# Patient Record
Sex: FEMALE | Race: BLACK OR AFRICAN AMERICAN | NOT HISPANIC OR LATINO | Employment: FULL TIME | URBAN - METROPOLITAN AREA
[De-identification: names, ages, dates, MRNs, and addresses within clinical notes are randomized per-mention and may not be internally consistent; named-entity substitution may affect disease eponyms.]

---

## 2018-01-19 ENCOUNTER — HOSPITAL ENCOUNTER (OUTPATIENT)
Facility: HOSPITAL | Age: 28
Discharge: HOME | End: 2018-01-20
Attending: EMERGENCY MEDICINE | Admitting: UROLOGY
Payer: COMMERCIAL

## 2018-01-19 ENCOUNTER — HOSPITAL ENCOUNTER (EMERGENCY)
Facility: OTHER | Age: 28
End: 2018-01-19
Attending: EMERGENCY MEDICINE
Payer: COMMERCIAL

## 2018-01-19 VITALS
OXYGEN SATURATION: 100 % | HEIGHT: 65 IN | HEART RATE: 74 BPM | WEIGHT: 130 LBS | RESPIRATION RATE: 17 BRPM | BODY MASS INDEX: 21.66 KG/M2 | DIASTOLIC BLOOD PRESSURE: 57 MMHG | SYSTOLIC BLOOD PRESSURE: 115 MMHG | TEMPERATURE: 97 F

## 2018-01-19 DIAGNOSIS — N13.30 HYDROURETERONEPHROSIS: ICD-10-CM

## 2018-01-19 DIAGNOSIS — N13.5 URETERAL OBSTRUCTION: Primary | ICD-10-CM

## 2018-01-19 DIAGNOSIS — R10.9 ABDOMINAL PAIN: ICD-10-CM

## 2018-01-19 DIAGNOSIS — N20.1 URETERAL STONE: ICD-10-CM

## 2018-01-19 DIAGNOSIS — N20.0 KIDNEY STONES: Primary | ICD-10-CM

## 2018-01-19 DIAGNOSIS — N13.9 OBSTRUCTIVE UROPATHY: ICD-10-CM

## 2018-01-19 LAB
ALBUMIN SERPL-MCNC: 4.2 G/DL (ref 3.3–5.5)
ALP SERPL-CCNC: 53 U/L (ref 42–141)
B-HCG UR QL: NEGATIVE
BILIRUB SERPL-MCNC: 0.8 MG/DL (ref 0.2–1.6)
BILIRUBIN, POC UA: NEGATIVE
BLOOD, POC UA: ABNORMAL
BUN SERPL-MCNC: 13 MG/DL (ref 7–22)
CALCIUM SERPL-MCNC: 9.9 MG/DL (ref 8–10.3)
CHLORIDE SERPL-SCNC: 103 MMOL/L (ref 98–108)
CLARITY, POC UA: CLEAR
COLOR, POC UA: ABNORMAL
CREAT SERPL-MCNC: 0.9 MG/DL (ref 0.6–1.2)
CTP QC/QA: YES
GLUCOSE SERPL-MCNC: 105 MG/DL (ref 73–118)
GLUCOSE, POC UA: NEGATIVE
KETONES, POC UA: ABNORMAL
LEUKOCYTE EST, POC UA: NEGATIVE
NITRITE, POC UA: NEGATIVE
PH UR STRIP: 8 [PH]
POC ALT (SGPT): 19 U/L (ref 10–47)
POC AST (SGOT): 28 U/L (ref 11–38)
POC TCO2: 24 MMOL/L (ref 18–33)
POTASSIUM BLD-SCNC: 3.6 MMOL/L (ref 3.6–5.1)
PROTEIN, POC UA: ABNORMAL
PROTEIN, POC: 7.8 G/DL (ref 6.4–8.1)
SODIUM BLD-SCNC: 144 MMOL/L (ref 128–145)
SPECIFIC GRAVITY, POC UA: 1.02
UROBILINOGEN, POC UA: 0.2 E.U./DL

## 2018-01-19 PROCEDURE — 87086 URINE CULTURE/COLONY COUNT: CPT

## 2018-01-19 PROCEDURE — 96372 THER/PROPH/DIAG INJ SC/IM: CPT | Mod: XU

## 2018-01-19 PROCEDURE — 99284 EMERGENCY DEPT VISIT MOD MDM: CPT | Mod: ,,, | Performed by: EMERGENCY MEDICINE

## 2018-01-19 PROCEDURE — 96374 THER/PROPH/DIAG INJ IV PUSH: CPT

## 2018-01-19 PROCEDURE — G0378 HOSPITAL OBSERVATION PER HR: HCPCS

## 2018-01-19 PROCEDURE — 63600175 PHARM REV CODE 636 W HCPCS: Performed by: NURSE PRACTITIONER

## 2018-01-19 PROCEDURE — 25000003 PHARM REV CODE 250: Performed by: NURSE PRACTITIONER

## 2018-01-19 PROCEDURE — 25000003 PHARM REV CODE 250: Performed by: STUDENT IN AN ORGANIZED HEALTH CARE EDUCATION/TRAINING PROGRAM

## 2018-01-19 PROCEDURE — 81025 URINE PREGNANCY TEST: CPT | Performed by: NURSE PRACTITIONER

## 2018-01-19 PROCEDURE — 96361 HYDRATE IV INFUSION ADD-ON: CPT

## 2018-01-19 PROCEDURE — 99244 OFF/OP CNSLTJ NEW/EST MOD 40: CPT | Mod: ,,, | Performed by: UROLOGY

## 2018-01-19 PROCEDURE — 96375 TX/PRO/DX INJ NEW DRUG ADDON: CPT

## 2018-01-19 PROCEDURE — 99285 EMERGENCY DEPT VISIT HI MDM: CPT | Mod: 25

## 2018-01-19 PROCEDURE — 99284 EMERGENCY DEPT VISIT MOD MDM: CPT | Mod: 25,27

## 2018-01-19 PROCEDURE — 81003 URINALYSIS AUTO W/O SCOPE: CPT

## 2018-01-19 PROCEDURE — 80053 COMPREHEN METABOLIC PANEL: CPT

## 2018-01-19 PROCEDURE — 85025 COMPLETE CBC W/AUTO DIFF WBC: CPT

## 2018-01-19 PROCEDURE — 96365 THER/PROPH/DIAG IV INF INIT: CPT

## 2018-01-19 RX ORDER — ONDANSETRON 4 MG/1
8 TABLET, ORALLY DISINTEGRATING ORAL
Status: COMPLETED | OUTPATIENT
Start: 2018-01-19 | End: 2018-01-19

## 2018-01-19 RX ORDER — SODIUM CHLORIDE 9 MG/ML
INJECTION, SOLUTION INTRAVENOUS CONTINUOUS
Status: DISCONTINUED | OUTPATIENT
Start: 2018-01-19 | End: 2018-01-20 | Stop reason: HOSPADM

## 2018-01-19 RX ORDER — KETOROLAC TROMETHAMINE 30 MG/ML
15 INJECTION, SOLUTION INTRAMUSCULAR; INTRAVENOUS
Status: COMPLETED | OUTPATIENT
Start: 2018-01-19 | End: 2018-01-19

## 2018-01-19 RX ORDER — OXYCODONE HYDROCHLORIDE 5 MG/1
10 TABLET ORAL EVERY 4 HOURS PRN
Status: DISCONTINUED | OUTPATIENT
Start: 2018-01-19 | End: 2018-01-20 | Stop reason: HOSPADM

## 2018-01-19 RX ORDER — SODIUM CHLORIDE 0.9 % (FLUSH) 0.9 %
3 SYRINGE (ML) INJECTION
Status: DISCONTINUED | OUTPATIENT
Start: 2018-01-19 | End: 2018-01-20 | Stop reason: HOSPADM

## 2018-01-19 RX ORDER — ONDANSETRON 2 MG/ML
4 INJECTION INTRAMUSCULAR; INTRAVENOUS EVERY 6 HOURS PRN
Status: DISCONTINUED | OUTPATIENT
Start: 2018-01-19 | End: 2018-01-20 | Stop reason: HOSPADM

## 2018-01-19 RX ORDER — SODIUM CHLORIDE 9 MG/ML
1000 INJECTION, SOLUTION INTRAVENOUS
Status: COMPLETED | OUTPATIENT
Start: 2018-01-19 | End: 2018-01-19

## 2018-01-19 RX ORDER — OXYCODONE HYDROCHLORIDE 5 MG/1
5 TABLET ORAL EVERY 4 HOURS PRN
Status: DISCONTINUED | OUTPATIENT
Start: 2018-01-19 | End: 2018-01-20 | Stop reason: HOSPADM

## 2018-01-19 RX ORDER — ACETAMINOPHEN 10 MG/ML
1000 INJECTION, SOLUTION INTRAVENOUS EVERY 8 HOURS
Status: DISCONTINUED | OUTPATIENT
Start: 2018-01-20 | End: 2018-01-20 | Stop reason: HOSPADM

## 2018-01-19 RX ORDER — DIPHENHYDRAMINE HCL 25 MG
25 CAPSULE ORAL EVERY 12 HOURS PRN
Status: DISCONTINUED | OUTPATIENT
Start: 2018-01-19 | End: 2018-01-20 | Stop reason: HOSPADM

## 2018-01-19 RX ORDER — DICYCLOMINE HYDROCHLORIDE 10 MG/ML
20 INJECTION INTRAMUSCULAR
Status: COMPLETED | OUTPATIENT
Start: 2018-01-19 | End: 2018-01-19

## 2018-01-19 RX ORDER — TAMSULOSIN HYDROCHLORIDE 0.4 MG/1
0.4 CAPSULE ORAL NIGHTLY
Status: DISCONTINUED | OUTPATIENT
Start: 2018-01-19 | End: 2018-01-20 | Stop reason: HOSPADM

## 2018-01-19 RX ORDER — MORPHINE SULFATE 2 MG/ML
3 INJECTION, SOLUTION INTRAMUSCULAR; INTRAVENOUS
Status: DISCONTINUED | OUTPATIENT
Start: 2018-01-19 | End: 2018-01-20 | Stop reason: HOSPADM

## 2018-01-19 RX ORDER — CEFTRIAXONE 1 G/1
1 INJECTION, POWDER, FOR SOLUTION INTRAMUSCULAR; INTRAVENOUS
Status: COMPLETED | OUTPATIENT
Start: 2018-01-19 | End: 2018-01-19

## 2018-01-19 RX ADMIN — CEFTRIAXONE SODIUM 1 G: 1 INJECTION, POWDER, FOR SOLUTION INTRAMUSCULAR; INTRAVENOUS at 04:01

## 2018-01-19 RX ADMIN — ONDANSETRON 8 MG: 4 TABLET, ORALLY DISINTEGRATING ORAL at 01:01

## 2018-01-19 RX ADMIN — TAMSULOSIN HYDROCHLORIDE 0.4 MG: 0.4 CAPSULE ORAL at 11:01

## 2018-01-19 RX ADMIN — DICYCLOMINE HYDROCHLORIDE 20 MG: 20 INJECTION, SOLUTION INTRAMUSCULAR at 01:01

## 2018-01-19 RX ADMIN — SODIUM CHLORIDE 1000 ML: 0.9 INJECTION, SOLUTION INTRAVENOUS at 02:01

## 2018-01-19 RX ADMIN — KETOROLAC TROMETHAMINE 15 MG: 30 INJECTION, SOLUTION INTRAMUSCULAR at 02:01

## 2018-01-19 RX ADMIN — PROMETHAZINE HYDROCHLORIDE 12.5 MG: 25 INJECTION INTRAMUSCULAR; INTRAVENOUS at 02:01

## 2018-01-19 NOTE — ED PROVIDER NOTES
"Encounter Date: 1/19/2018       History     Chief Complaint   Patient presents with    Abdominal Pain     pt states "I started having right-sided abdominal pain after eating cereal this morning at work" "I vomited a few times this morning"     HPI  Review of patient's allergies indicates:  No Known Allergies  History reviewed. No pertinent past medical history.  History reviewed. No pertinent surgical history.  History reviewed. No pertinent family history.  Social History   Substance Use Topics    Smoking status: Never Smoker    Smokeless tobacco: Never Used    Alcohol use No     Review of Systems   Constitutional: Negative for fever.   HENT: Negative for sore throat.    Respiratory: Negative for shortness of breath.    Cardiovascular: Negative for chest pain.   Gastrointestinal: Positive for abdominal pain, nausea and vomiting.   Genitourinary: Negative for dysuria.   Musculoskeletal: Negative for back pain.   Skin: Negative for rash.   Neurological: Negative for weakness.   Hematological: Does not bruise/bleed easily.   All other systems reviewed and are negative.      Physical Exam     Initial Vitals [01/19/18 1257]   BP Pulse Resp Temp SpO2   115/64 70 18 97.4 °F (36.3 °C) 97 %      MAP       81         Physical Exam    Nursing note and vitals reviewed.  Constitutional: Vital signs are normal. She appears well-developed. She is cooperative. She does not appear ill.   HENT:   Head: Normocephalic and atraumatic.   Right Ear: External ear normal.   Left Ear: External ear normal.   Nose: Nose normal.   Mouth/Throat: Oropharynx is clear and moist.   Eyes: Conjunctivae and lids are normal. Pupils are equal, round, and reactive to light.   Neck: Normal range of motion. Neck supple.   Cardiovascular: Normal rate, regular rhythm, normal heart sounds and intact distal pulses.   Pulmonary/Chest: Effort normal and breath sounds normal.   Abdominal: Soft. Normal appearance and bowel sounds are normal. There is no " tenderness.   Musculoskeletal: Normal range of motion.   Neurological: She is alert and oriented to person, place, and time.   Skin: Skin is warm, dry and intact. Capillary refill takes less than 2 seconds. No rash noted.   Psychiatric: She has a normal mood and affect. Her behavior is normal. Judgment and thought content normal. Cognition and memory are normal.         ED Course   Procedures  Labs Reviewed   POCT URINALYSIS W/O SCOPE - Abnormal; Notable for the following:        Result Value    Glucose, UA Negative (*)     Bilirubin, UA Negative (*)     Ketones, UA 2+ (*)     Blood, UA 3+ (*)     Protein, UA 1+ (*)     Nitrite, UA Negative (*)     Leukocytes, UA Negative (*)     All other components within normal limits   POCT URINE PREGNANCY   POCT URINALYSIS W/O SCOPE   POCT CBC        Imaging Results          CT Renal Stone Study ABD Pelvis WO (Final result)  Result time 01/19/18 15:14:03    Final result by Aleksey Chung MD (01/19/18 15:14:03)                 Impression:        1. Right-sided hydroureteronephrosis likely secondary to a 4-5 mm calculus within the distal right ureter. Additional multiple right-sided nephroliths.    2. Moderate to severe left-sided hydronephrosis with marked dilatation of the left renal pelvis but no associated left-sided hydroureter, discrete mechanical cause or radiodense calcifications seen within the left collecting system. 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      Electronically signed by: ALEKSEY CHUNG MD, MD  Date:     01/19/18  Time:    15:14              Narrative:    CT of the abdomen and pelvis without contrast per renal stone protocol:    Results:  Comparison made to abdominal series earlier same day.  Please note that the lack of intravenous contrast limits evaluation of soft tissue and vascular structures. Study is somewhat limited by paucity of intra-abdominal  fat with closely apposed loops of bowel.    The lung bases are clear.  The visualized heart and pericardium are unremarkable.      The unenhanced liver, gallbladder, pancreas, spleen, stomach, duodenum, and adrenal glands are without significant abnormality. No biliary ductal dilatation.    The kidneys are normal in size and location.  There is mild right-sided hydroureteronephrosis to the level of the right lower quadrant/upper pelvis where there is a 4-5 mm rounded calcification suggestive of an obstructing distal ureteral calculus. Multiple scattered subcentimeter calcifications noted throughout the right kidney likely corresponding to calcification seen on recent abdominal radiograph, with the largest measuring up to 4-5 mm at the interpolar region. There is moderate to severe hydronephrosis with marked dilatation of the left renal pelvis without definite left-sided hydroureter or radiodense left-sided renal or ureteral calculus seen. The left ureter is not definitively seen.  No radiodense calculus seen within the urinary bladder. No significant right upper stranding seen on either side. The urinary bladder is suboptimally distended.  Noncontrast appearance of the uterus and bilateral adnexal regions are within normal limits. No significant amount of free fluid seen within the pelvis. A few scattered punctate pelvic phleboliths noted.    No ascites or free air.  No lymphadenopathy.    The appendix and terminal ileum appear normal.  The small and large loops of bowel are normal in caliber without focal wall thickening or adjacent fat stranding.  Tiny fat containing umbilical hernia. No pneumatosis or portal venous gas.    The aorta tapers appropriately in its descent through the abdomen.    The osseous structures appear intact.                             X-Ray Abdomen Flat And Erect (Final result)  Result time 01/19/18 13:36:06    Final result by Andrew Chung MD (01/19/18 13:36:06)                  Impression:         Nonobstructive bowel gas pattern.    Right midabdominal multiple small calcifications which may represent renal nephroliths. Correlate clinically.    Prominent right hepatic shadow which could represent Riedel's lobe configuration versus hepatomegaly.      Electronically signed by: ALEKSEY THOMASON MD, MD  Date:     01/19/18  Time:    13:36              Narrative:    Comparison: None.    Findings: Upright and supine views of the abdomen.  Relative paucity of bowel gas with the exception of mildly distended gastric bubble and mild amount of gas and stool project over the pelvis likely within the sigmoid colon and rectum. No dilated loops of bowel or small bowel air-fluid levels to suggest obstruction.    Multiple small calcifications project over the right midabdomen/renal shadow, ranging 1--3 mm.    No free air. Prominent hepatic shadow, without significant mass effect.    Osseous structures appear intact.  Visualized lung bases are clear.                                  Medical Decision Making:   Initial Assessment:   A 27-year-old female presents to the ED with complaints of right-sided abdominal pain and vomiting.  Patient states she ate cereal and then she vomited 3 times.  Patient denies fever or diarrhea.  Differential Diagnosis:   Acute appendicitis  UTI  Pyelonephritis  Kidney stone    Clinical Tests:   Lab Tests: Ordered and Reviewed  The following lab test(s) were unremarkable: CMP and CBC  ED Management:  Physical exam. UPT. U/A. CMP, CBC. Zofran ODT 8 mg. Oral fluid challenge not tolerated well. 1 Liter NS bolus. Promethazine 12.5 mg IV. Nausea improved.   This patient would appear to have a left-sided dilated nonfunctioning collecting system that is chronic.  The patient presented with acute right-sided symptoms confirmed with a 4-5 mm left distal ureteral stone with mild hydro-I spoke with Dr. Wynne who was on-call for urology today and he and I both feel the patient would benefit  from urological evaluation and possible stenting of that right kidney.  The patient's urine was sent for culture and the patient receive Rocephin and is currently comfortable and department patient is currently pending transport via EMS to [facility] main [facility].                   ED Course      Clinical Impression:   The primary encounter diagnosis was Kidney stones. Diagnoses of Abdominal pain and Obstructive uropathy were also pertinent to this visit.                           Sonia Peter, CODY  01/19/18 [id number]

## 2018-01-20 ENCOUNTER — ANESTHESIA (OUTPATIENT)
Dept: SURGERY | Facility: HOSPITAL | Age: 28
End: 2018-01-20
Payer: COMMERCIAL

## 2018-01-20 ENCOUNTER — SURGERY (OUTPATIENT)
Age: 28
End: 2018-01-20

## 2018-01-20 ENCOUNTER — ANESTHESIA EVENT (OUTPATIENT)
Dept: SURGERY | Facility: HOSPITAL | Age: 28
End: 2018-01-20
Payer: COMMERCIAL

## 2018-01-20 VITALS
DIASTOLIC BLOOD PRESSURE: 72 MMHG | SYSTOLIC BLOOD PRESSURE: 127 MMHG | HEART RATE: 70 BPM | TEMPERATURE: 99 F | OXYGEN SATURATION: 99 % | RESPIRATION RATE: 16 BRPM | HEIGHT: 65 IN | WEIGHT: 135 LBS | BODY MASS INDEX: 22.49 KG/M2

## 2018-01-20 PROBLEM — N20.0 NEPHROLITHIASIS: Status: ACTIVE | Noted: 2018-01-20

## 2018-01-20 PROBLEM — N13.5 URETEROPELVIC JUNCTION (UPJ) OBSTRUCTION, LEFT: Status: ACTIVE | Noted: 2018-01-20

## 2018-01-20 PROCEDURE — 82365 CALCULUS SPECTROSCOPY: CPT

## 2018-01-20 PROCEDURE — C1758 CATHETER, URETERAL: HCPCS | Performed by: UROLOGY

## 2018-01-20 PROCEDURE — G0378 HOSPITAL OBSERVATION PER HR: HCPCS

## 2018-01-20 PROCEDURE — 37000008 HC ANESTHESIA 1ST 15 MINUTES: Performed by: UROLOGY

## 2018-01-20 PROCEDURE — 36000706: Performed by: UROLOGY

## 2018-01-20 PROCEDURE — 25000003 PHARM REV CODE 250: Performed by: NURSE ANESTHETIST, CERTIFIED REGISTERED

## 2018-01-20 PROCEDURE — D9220A PRA ANESTHESIA: Mod: CRNA,,, | Performed by: NURSE ANESTHETIST, CERTIFIED REGISTERED

## 2018-01-20 PROCEDURE — 25500020 PHARM REV CODE 255: Performed by: UROLOGY

## 2018-01-20 PROCEDURE — C1769 GUIDE WIRE: HCPCS | Performed by: UROLOGY

## 2018-01-20 PROCEDURE — 71000033 HC RECOVERY, INTIAL HOUR: Performed by: UROLOGY

## 2018-01-20 PROCEDURE — 36000707: Performed by: UROLOGY

## 2018-01-20 PROCEDURE — S0028 INJECTION, FAMOTIDINE, 20 MG: HCPCS | Performed by: NURSE ANESTHETIST, CERTIFIED REGISTERED

## 2018-01-20 PROCEDURE — 52356 CYSTO/URETERO W/LITHOTRIPSY: CPT | Mod: RT,,, | Performed by: UROLOGY

## 2018-01-20 PROCEDURE — 27201423 OPTIME MED/SURG SUP & DEVICES STERILE SUPPLY: Performed by: UROLOGY

## 2018-01-20 PROCEDURE — 25000003 PHARM REV CODE 250: Performed by: STUDENT IN AN ORGANIZED HEALTH CARE EDUCATION/TRAINING PROGRAM

## 2018-01-20 PROCEDURE — C2617 STENT, NON-COR, TEM W/O DEL: HCPCS | Performed by: UROLOGY

## 2018-01-20 PROCEDURE — 63600175 PHARM REV CODE 636 W HCPCS: Performed by: STUDENT IN AN ORGANIZED HEALTH CARE EDUCATION/TRAINING PROGRAM

## 2018-01-20 PROCEDURE — 74420 UROGRAPHY RTRGR +-KUB: CPT | Mod: 26,,, | Performed by: UROLOGY

## 2018-01-20 PROCEDURE — 52332 CYSTOSCOPY AND TREATMENT: CPT | Mod: 59,LT,, | Performed by: UROLOGY

## 2018-01-20 PROCEDURE — D9220A PRA ANESTHESIA: Mod: ANES,,, | Performed by: ANESTHESIOLOGY

## 2018-01-20 PROCEDURE — 37000009 HC ANESTHESIA EA ADD 15 MINS: Performed by: UROLOGY

## 2018-01-20 PROCEDURE — 63600175 PHARM REV CODE 636 W HCPCS: Performed by: NURSE ANESTHETIST, CERTIFIED REGISTERED

## 2018-01-20 PROCEDURE — 76000 FLUOROSCOPY <1 HR PHYS/QHP: CPT | Mod: 26,59,, | Performed by: UROLOGY

## 2018-01-20 DEVICE — STENT URETERAL UNIV 6FR 24CM: Type: IMPLANTABLE DEVICE | Site: URETER | Status: FUNCTIONAL

## 2018-01-20 RX ORDER — HYOSCYAMINE SULFATE 0.12 MG/1
0.12 TABLET SUBLINGUAL EVERY 4 HOURS PRN
Qty: 20 TABLET | Refills: 1 | Status: ON HOLD | OUTPATIENT
Start: 2018-01-20 | End: 2018-03-09

## 2018-01-20 RX ORDER — ONDANSETRON 2 MG/ML
INJECTION INTRAMUSCULAR; INTRAVENOUS
Status: DISCONTINUED | OUTPATIENT
Start: 2018-01-20 | End: 2018-01-20

## 2018-01-20 RX ORDER — CEFAZOLIN SODIUM 1 G/3ML
INJECTION, POWDER, FOR SOLUTION INTRAMUSCULAR; INTRAVENOUS
Status: DISCONTINUED | OUTPATIENT
Start: 2018-01-20 | End: 2018-01-20

## 2018-01-20 RX ORDER — SODIUM CHLORIDE 0.9 % (FLUSH) 0.9 %
3 SYRINGE (ML) INJECTION
Status: DISCONTINUED | OUTPATIENT
Start: 2018-01-20 | End: 2018-01-20 | Stop reason: HOSPADM

## 2018-01-20 RX ORDER — ESMOLOL HYDROCHLORIDE 10 MG/ML
INJECTION INTRAVENOUS
Status: DISCONTINUED | OUTPATIENT
Start: 2018-01-20 | End: 2018-01-20

## 2018-01-20 RX ORDER — PROPOFOL 10 MG/ML
VIAL (ML) INTRAVENOUS
Status: DISCONTINUED | OUTPATIENT
Start: 2018-01-20 | End: 2018-01-20

## 2018-01-20 RX ORDER — POLYETHYLENE GLYCOL 3350 17 G/17G
17 POWDER, FOR SOLUTION ORAL DAILY
Qty: 30 PACKET | Refills: 0 | OUTPATIENT
Start: 2018-01-20 | End: 2018-01-20

## 2018-01-20 RX ORDER — POLYETHYLENE GLYCOL 3350 17 G/17G
17 POWDER, FOR SOLUTION ORAL DAILY
Qty: 30 PACKET | Refills: 0 | Status: SHIPPED | OUTPATIENT
Start: 2018-01-20 | End: 2018-02-12

## 2018-01-20 RX ORDER — FENTANYL CITRATE 50 UG/ML
25 INJECTION, SOLUTION INTRAMUSCULAR; INTRAVENOUS EVERY 5 MIN PRN
Status: DISCONTINUED | OUTPATIENT
Start: 2018-01-20 | End: 2018-01-20 | Stop reason: HOSPADM

## 2018-01-20 RX ORDER — FENTANYL CITRATE 50 UG/ML
INJECTION, SOLUTION INTRAMUSCULAR; INTRAVENOUS
Status: DISCONTINUED | OUTPATIENT
Start: 2018-01-20 | End: 2018-01-20

## 2018-01-20 RX ORDER — HYOSCYAMINE SULFATE 0.12 MG/1
0.12 TABLET SUBLINGUAL EVERY 4 HOURS PRN
Qty: 20 TABLET | Refills: 1 | OUTPATIENT
Start: 2018-01-20 | End: 2018-01-20

## 2018-01-20 RX ORDER — LIDOCAINE HCL/PF 100 MG/5ML
SYRINGE (ML) INTRAVENOUS
Status: DISCONTINUED | OUTPATIENT
Start: 2018-01-20 | End: 2018-01-20

## 2018-01-20 RX ORDER — MIDAZOLAM HYDROCHLORIDE 1 MG/ML
INJECTION, SOLUTION INTRAMUSCULAR; INTRAVENOUS
Status: DISCONTINUED | OUTPATIENT
Start: 2018-01-20 | End: 2018-01-20

## 2018-01-20 RX ORDER — FAMOTIDINE 10 MG/ML
INJECTION INTRAVENOUS
Status: DISCONTINUED | OUTPATIENT
Start: 2018-01-20 | End: 2018-01-20

## 2018-01-20 RX ORDER — OXYCODONE AND ACETAMINOPHEN 5; 325 MG/1; MG/1
1 TABLET ORAL EVERY 6 HOURS PRN
Qty: 15 TABLET | Refills: 0 | Status: SHIPPED | OUTPATIENT
Start: 2018-01-20 | End: 2018-01-20

## 2018-01-20 RX ORDER — TAMSULOSIN HYDROCHLORIDE 0.4 MG/1
0.4 CAPSULE ORAL DAILY
Qty: 30 CAPSULE | Refills: 0 | Status: ON HOLD | OUTPATIENT
Start: 2018-01-20 | End: 2018-03-09

## 2018-01-20 RX ORDER — TAMSULOSIN HYDROCHLORIDE 0.4 MG/1
0.4 CAPSULE ORAL DAILY
Qty: 30 CAPSULE | Refills: 0 | OUTPATIENT
Start: 2018-01-20 | End: 2018-01-20

## 2018-01-20 RX ORDER — ROCURONIUM BROMIDE 10 MG/ML
INJECTION, SOLUTION INTRAVENOUS
Status: DISCONTINUED | OUTPATIENT
Start: 2018-01-20 | End: 2018-01-20

## 2018-01-20 RX ORDER — OXYCODONE AND ACETAMINOPHEN 5; 325 MG/1; MG/1
1 TABLET ORAL EVERY 6 HOURS PRN
Qty: 15 TABLET | Refills: 0 | Status: ON HOLD | OUTPATIENT
Start: 2018-01-20 | End: 2018-03-09

## 2018-01-20 RX ORDER — DEXAMETHASONE SODIUM PHOSPHATE 4 MG/ML
INJECTION, SOLUTION INTRA-ARTICULAR; INTRALESIONAL; INTRAMUSCULAR; INTRAVENOUS; SOFT TISSUE
Status: DISCONTINUED | OUTPATIENT
Start: 2018-01-20 | End: 2018-01-20

## 2018-01-20 RX ADMIN — ONDANSETRON 4 MG: 2 INJECTION INTRAMUSCULAR; INTRAVENOUS at 08:01

## 2018-01-20 RX ADMIN — DEXAMETHASONE SODIUM PHOSPHATE 8 MG: 4 INJECTION, SOLUTION INTRAMUSCULAR; INTRAVENOUS at 08:01

## 2018-01-20 RX ADMIN — SUGAMMADEX 200 MG: 100 INJECTION, SOLUTION INTRAVENOUS at 08:01

## 2018-01-20 RX ADMIN — LIDOCAINE HYDROCHLORIDE 100 MG: 20 INJECTION, SOLUTION INTRAVENOUS at 07:01

## 2018-01-20 RX ADMIN — IOHEXOL 50 ML: 300 INJECTION, SOLUTION INTRAVENOUS at 08:01

## 2018-01-20 RX ADMIN — ESMOLOL HYDROCHLORIDE 20 MG: 10 INJECTION INTRAVENOUS at 08:01

## 2018-01-20 RX ADMIN — PROPOFOL 150 MG: 10 INJECTION, EMULSION INTRAVENOUS at 07:01

## 2018-01-20 RX ADMIN — FAMOTIDINE 20 MG: 10 INJECTION, SOLUTION INTRAVENOUS at 08:01

## 2018-01-20 RX ADMIN — SODIUM CHLORIDE, SODIUM GLUCONATE, SODIUM ACETATE, POTASSIUM CHLORIDE, MAGNESIUM CHLORIDE, SODIUM PHOSPHATE, DIBASIC, AND POTASSIUM PHOSPHATE: .53; .5; .37; .037; .03; .012; .00082 INJECTION, SOLUTION INTRAVENOUS at 07:01

## 2018-01-20 RX ADMIN — MIDAZOLAM HYDROCHLORIDE 2 MG: 1 INJECTION, SOLUTION INTRAMUSCULAR; INTRAVENOUS at 07:01

## 2018-01-20 RX ADMIN — SODIUM CHLORIDE, SODIUM GLUCONATE, SODIUM ACETATE, POTASSIUM CHLORIDE, MAGNESIUM CHLORIDE, SODIUM PHOSPHATE, DIBASIC, AND POTASSIUM PHOSPHATE: .53; .5; .37; .037; .03; .012; .00082 INJECTION, SOLUTION INTRAVENOUS at 08:01

## 2018-01-20 RX ADMIN — FENTANYL CITRATE 100 MCG: 50 INJECTION, SOLUTION INTRAMUSCULAR; INTRAVENOUS at 07:01

## 2018-01-20 RX ADMIN — ONDANSETRON 4 MG: 2 INJECTION INTRAMUSCULAR; INTRAVENOUS at 12:01

## 2018-01-20 RX ADMIN — ROCURONIUM BROMIDE 40 MG: 10 INJECTION, SOLUTION INTRAVENOUS at 07:01

## 2018-01-20 RX ADMIN — SODIUM CHLORIDE: 0.9 INJECTION, SOLUTION INTRAVENOUS at 12:01

## 2018-01-20 RX ADMIN — CEFAZOLIN 2 G: 330 INJECTION, POWDER, FOR SOLUTION INTRAMUSCULAR; INTRAVENOUS at 07:01

## 2018-01-20 RX ADMIN — ACETAMINOPHEN 1000 MG: 10 INJECTION, SOLUTION INTRAVENOUS at 06:01

## 2018-01-20 NOTE — PROGRESS NOTES
[facility]  Urology  Progress Note    Patient Name: Sabina Clements  MRN: [id number]  Admission Date: 1/19/2018  Hospital Length of Stay: 0 days  Code Status: Full Code   Attending Provider: Agus Wynne MD   Primary Care Physician: Primary Doctor No    Subjective:     HPI:  27 YOF with no pertinent past medical history presented to outside ED on 1/19/20108 with right-sided abdominal and flank pain.    Onset was sudden associated with nausea and vomiting.  +N, +Vx3.  CT RSS demonstrated right ureteral stone with right moderate hydroureteronephrosis, multiple non-obstructing right renal stones, and severe left hydronephrosis without hydroureter, concerning for chronic UPJ-obstruction.  No leukocytosis, uremia, or UTI. Patient is voiding spontaneously, normal volumes.  Clear light pink urine per pt.       Interval History:     DAVDI  No F/C/N/V  NPO  No pain     Review of Systems  Objective:     Temp:  [97.4 °F (36.3 °C)-98.7 °F (37.1 °C)] 98.4 °F (36.9 °C)  Pulse:  [55-86] 55  Resp:  [17-20] 20  SpO2:  [97 %-100 %] 99 %  BP: (110-128)/(55-74) 115/55     Body mass index is 22.47 kg/m².            Drains          No matching active lines, drains, or airways          Physical Exam   Constitutional: She is oriented to person, place, and time. She appears well-developed and well-nourished. No distress.   HENT:   Head: Normocephalic and atraumatic.   Cardiovascular: Normal rate.    Pulmonary/Chest: Effort normal. No respiratory distress.   Abdominal: She exhibits no distension. There is no tenderness. There is no CVA tenderness.   Neurological: She is alert and oriented to person, place, and time.   Skin: She is not diaphoretic.     Psychiatric: She has a normal mood and affect.       Significant Labs:    BMP:  No results for input(s): NA, K, CL, CO2, BUN, CREATININE, LABGLOM, GLUCOSE, CALCIUM in the last 168 hours.    CBC:   No results for input(s): WBC, HGB, HCT, PLT in the last 168 hours.    Urine  Studies:   Recent Labs  Lab 01/19/18  1310   COLORU Martha   SPECGRAV 1.020   NITRITE Negative*   UROBILINOGEN 0.2   LEUKOCYTESUR Negative*       Significant Imaging:  All pertinent imaging results/findings from the past 24 hours have been reviewed.                  Assessment/Plan:     Hydroureteronephrosis, right      - right hydroureteronephrosis likely 2/2 right ureteral stone   - left hydronephrosis likely 2/2 UPJ-O  - NPO  - IVF  - pain control  - nausea control   - ambulate TID  - TEDs/SCDs    To OR today for right ureteroscopy, possible laser lithotripsy, stone extraction, stent insertion, left ureteral stent insertion.  Home afterward.             VTE Risk Mitigation         Ordered     Medium Risk of VTE  Once      01/19/18 2219     Place LEXI hose  Until discontinued      01/19/18 2219          Marco A Jenkins MD  Urology  [facility]

## 2018-01-20 NOTE — ED NOTES
Adult Physical Assessment  LOC: Sabina Clements, 27 y.o. female verified via two identifiers.  The patient is awake, alert, oriented and speaking appropriately at this time.  APPEARANCE: Patient resting comfortably and appears to be in no acute distress at this time. Patient is clean and well groomed, patient's clothing is properly fastened.  SKIN:The skin is warm and dry, color consistent with ethnicity, patient has normal skin turgor and moist mucus membranes, skin intact, no breakdown or brusing noted.  MUSCULOSKELETAL: Patient moving all extremities well, no obvious swelling or deformities noted.  RESPIRATORY: Airway is open and patent, respirations are spontaneous, patient has a normal effort and rate, no accessory muscle use noted.  CARDIAC: Patient has a normal rate and rhythm, no periphreal edema noted in any extremity, capillary refill < 3 seconds in all extremities  ABDOMEN: Soft and non tender to palpation, no abdominal distention noted. Bowel sounds present in all four quadrants.  NEUROLOGIC: Eyes open spontaneously, behavior appropriate to situation, follows commands, facial expression symmetrical, bilateral hand grasp equal and even, purposeful motor response noted, normal sensation in all extremities when touched with a finger.

## 2018-01-20 NOTE — HPI
27 YOF with no pertinent past medical history presented to outside ED on 1/19/20108 with right-sided abdominal and flank pain.    Onset was sudden associated with nausea and vomiting.  +N, +Vx3.  CT RSS demonstrated right ureteral stone with right moderate hydroureteronephrosis, multiple non-obstructing right renal stones, and severe left hydronephrosis without hydroureter, concerning for chronic UPJ-obstruction.  No leukocytosis, uremia, or UTI. Patient is voiding spontaneously, normal volumes.  Clear light pink urine per pt.

## 2018-01-20 NOTE — CONSULTS
[facility]  Urology  Consult Note    Patient Name: Sabina Clements  MRN: [id number]  Admission Date: 1/19/2018  Hospital Length of Stay: 0   Code Status: Full Code   Attending Provider: Agus Wynne MD   Consulting Provider: Marco A Jenkins MD  Primary Care Physician: Primary Doctor No  Principal Problem:<principal problem not specified>    Consults    Subjective:     HPI:  27 YOF with no pertinent past medical history presented to outside ED on 1/19/20108 with right-sided abdominal and flank pain.    Onset was sudden associated with nausea and vomiting.  +N, +Vx3.  CT RSS demonstrated right ureteral stone with right moderate hydroureteronephrosis, multiple non-obstructing right renal stones, and severe left hydronephrosis without hydroureter, concerning for chronic UPJ-obstruction.  No leukocytosis, uremia, or UTI. Patient is voiding spontaneously, normal volumes.  Clear light pink urine per pt.       History reviewed. No pertinent past medical history.    History reviewed. No pertinent surgical history.    Review of patient's allergies indicates:  No Known Allergies    Family History     None          Social History Main Topics    Smoking status: Never Smoker    Smokeless tobacco: Never Used    Alcohol use No    Drug use: No    Sexual activity: Yes       Review of Systems   Constitutional: Negative for chills and fatigue.   HENT: Negative for ear pain and hearing loss.    Eyes: Negative for pain and visual disturbance.   Respiratory: Negative for cough and shortness of breath.    Cardiovascular: Negative for chest pain and palpitations.   Gastrointestinal: Positive for abdominal pain, nausea and vomiting.   Genitourinary: Positive for flank pain and hematuria. Negative for dysuria and urgency.   Neurological: Negative for light-headedness and headaches.   Psychiatric/Behavioral: Negative for agitation and behavioral problems.       Objective:     Temp:  [97.4 °F (36.3 °C)-98.7 °F (37.1  °C)] 98.7 °F (37.1 °C)  Pulse:  [65-86] 65  Resp:  [17-19] 17  SpO2:  [97 %-100 %] 100 %  BP: (110-128)/(57-74) 128/74     Body mass index is 21.63 kg/m².            Drains          No matching active lines, drains, or airways          Physical Exam   Constitutional: She is oriented to person, place, and time. She appears well-developed and well-nourished. No distress.   HENT:   Head: Normocephalic and atraumatic.   Cardiovascular: Normal rate.    Pulmonary/Chest: Effort normal. No respiratory distress.   Abdominal: She exhibits no distension.   Neurological: She is alert and oriented to person, place, and time.   Skin: She is not diaphoretic.     Psychiatric: She has a normal mood and affect.       Significant Labs:    BMP:  No results for input(s): NA, K, CL, CO2, BUN, CREATININE, LABGLOM, GLUCOSE, CALCIUM in the last 168 hours.    CBC:  No results for input(s): WBC, HGB, HCT, PLT in the last 168 hours.    All pertinent labs results from the past 24 hours have been reviewed.    Significant Imaging:  CT RSS:  Multiple non-obstructing right renal stones.  Right hydroureteronephrosis most likely due to 5 mm calcification suspicious for mid ureteral stone.  Left sided UPJ obstruction with severe hydronephrosis. Bladder non-distended.                      Assessment and Plan:     Hydroureteronephrosis, right      - likely 2/2 right ureteral stone   - left hydronephrosis likely 2/2 UPJ-O  - admit to urology   - regular diet, NPO MN  - IVF  - pain control  - nausea control   - ambulate TID  - TEDs/SCDs    To OR tomorrow for right ureteroscopy, possible laser lithotripsy, stone extraction, stent insertion             VTE Risk Mitigation         Ordered     Medium Risk of VTE  Once      01/19/18 2219     Place LEXI hose  Until discontinued      01/19/18 2219          Thank you for your consult. I will follow-up with patient. Please contact us if you have any additional questions.    Marco A Jenkins MD  Urology  [facility]

## 2018-01-20 NOTE — ED PROVIDER NOTES
Encounter Date: 1/19/2018    SCRIBE #1 NOTE: I, Jose Zuleta, am scribing for, and in the presence of,  Jesus Moore MD. I have scribed the following portions of the note - the Resident attestation.       History     Chief Complaint   Patient presents with    [facility] Transfer     kidney stones     Patient is a 27-year-old female with no pertinent past medical history presents with right-sided abdominal pain that began earlier this afternoon she was at work.  Reports pain onset was sudden associated with nausea and vomiting.  She did leave work and vomited 2-3 additional times.  Patient presenting the emergency department she was worked up and imaging demonstrated hydroureteronephrosis.  Urology was consult they agreed to transfer her to [facility] for further evaluation.  They've seen the patient.          Review of patient's allergies indicates:  No Known Allergies  History reviewed. No pertinent past medical history.  History reviewed. No pertinent surgical history.  History reviewed. No pertinent family history.  Social History   Substance Use Topics    Smoking status: Never Smoker    Smokeless tobacco: Never Used    Alcohol use No     Review of Systems   Constitutional: Negative for fever.   HENT: Negative for sore throat.    Respiratory: Negative for shortness of breath.    Cardiovascular: Negative for chest pain.   Gastrointestinal: Positive for abdominal pain, nausea and vomiting.   Genitourinary: Negative for dysuria.   Musculoskeletal: Negative for back pain.   Skin: Negative for rash.   Neurological: Negative for weakness.   Hematological: Does not bruise/bleed easily.       Physical Exam     Initial Vitals [01/19/18 1818]   BP Pulse Resp Temp SpO2   110/62 69 17 98.7 °F (37.1 °C) 100 %      MAP       78         Physical Exam    Constitutional: She appears well-developed and well-nourished. She is not diaphoretic. No distress.   HENT:   Head: Normocephalic and atraumatic.   Eyes: EOM are normal.  Pupils are equal, round, and reactive to light.   Neck: Normal range of motion. Neck supple. No thyromegaly present. No tracheal deviation present.   Cardiovascular: Normal rate, regular rhythm, normal heart sounds and intact distal pulses. Exam reveals no gallop and no friction rub.    No murmur heard.  Pulmonary/Chest: Breath sounds normal. No stridor. No respiratory distress. She has no wheezes. She has no rales.   Abdominal: Soft. Bowel sounds are normal. She exhibits no distension. There is no tenderness. There is no rebound.   Musculoskeletal: Normal range of motion. She exhibits no edema.   Neurological: She is alert and oriented to person, place, and time. No cranial nerve deficit.   Skin: Skin is warm and dry. No erythema.   Psychiatric: She has a normal mood and affect. Her behavior is normal. Thought content normal.         ED Course   Procedures  Labs Reviewed - No data to display          Medical Decision Making:   History:   Old Medical Records: I decided to obtain old medical records.  Initial Assessment:   27-year-old female with a past medical history presents with kidney stones with hydroureteronephrosis  Differential Diagnosis:   As above.        APC / Resident Notes:   9:32 PM: Pending urology recs       Scribe Attestation:   Scribe #1: I performed the above scribed service and the documentation accurately describes the services I performed. I attest to the accuracy of the note.    Attending Attestation:   Physician Attestation Statement for Resident:  As the supervising MD   Physician Attestation Statement: I have personally seen and examined this patient.   I agree with the above history. -: 27 y.o. female transferred here with kidney stones for Urology consultation. We are awaiting disposition per their recommendations.      As the supervising MD I agree with the above PE.    As the supervising MD I agree with the above treatment, course, plan, and disposition.  I have reviewed the following:  old records at this facility.              Urology to obs.        ED Course      Clinical Impression:   The primary encounter diagnosis was Ureteral obstruction. Diagnoses of Ureteral stone and Hydroureteronephrosis, right were also pertinent to this visit.                           Jesus Moore MD  01/22/18 2045

## 2018-01-20 NOTE — SUBJECTIVE & OBJECTIVE
History reviewed. No pertinent past medical history.    History reviewed. No pertinent surgical history.    Review of patient's allergies indicates:  No Known Allergies    Family History     None          Social History Main Topics    Smoking status: Never Smoker    Smokeless tobacco: Never Used    Alcohol use No    Drug use: No    Sexual activity: Yes       Review of Systems   Constitutional: Negative for chills and fatigue.   HENT: Negative for ear pain and hearing loss.    Eyes: Negative for pain and visual disturbance.   Respiratory: Negative for cough and shortness of breath.    Cardiovascular: Negative for chest pain and palpitations.   Gastrointestinal: Positive for abdominal pain, nausea and vomiting.   Genitourinary: Positive for flank pain and hematuria. Negative for dysuria and urgency.   Neurological: Negative for light-headedness and headaches.   Psychiatric/Behavioral: Negative for agitation and behavioral problems.       Objective:     Temp:  [97.4 °F (36.3 °C)-98.7 °F (37.1 °C)] 98.7 °F (37.1 °C)  Pulse:  [65-86] 65  Resp:  [17-19] 17  SpO2:  [97 %-100 %] 100 %  BP: (110-128)/(57-74) 128/74     Body mass index is 21.63 kg/m².            Drains          No matching active lines, drains, or airways          Physical Exam   Constitutional: She is oriented to person, place, and time. She appears well-developed and well-nourished. No distress.   HENT:   Head: Normocephalic and atraumatic.   Cardiovascular: Normal rate.    Pulmonary/Chest: Effort normal. No respiratory distress.   Abdominal: She exhibits no distension.   Neurological: She is alert and oriented to person, place, and time.   Skin: She is not diaphoretic.     Psychiatric: She has a normal mood and affect.       Significant Labs:    BMP:  No results for input(s): NA, K, CL, CO2, BUN, CREATININE, LABGLOM, GLUCOSE, CALCIUM in the last 168 hours.    CBC:  No results for input(s): WBC, HGB, HCT, PLT in the last 168 hours.    All pertinent  labs results from the past 24 hours have been reviewed.    Significant Imaging:  CT RSS:  Multiple non-obstructing right renal stones.  Right hydroureteronephrosis most likely due to 5 mm calcification suspicious for mid ureteral stone.  Left sided UPJ obstruction with severe hydronephrosis. Bladder non-distended.

## 2018-01-20 NOTE — ED TRIAGE NOTES
Pt presents to the ed from [place] for urology eval of a left sided kidney stone. Pt reports her symptoms began around 7am.. Pt currently reports her pain is a 1/10 and denies and nausea at this time

## 2018-01-20 NOTE — SUBJECTIVE & OBJECTIVE
Interval History:     DAVID  No F/C/N/V  NPO  No pain     Review of Systems  Objective:     Temp:  [97.4 °F (36.3 °C)-98.7 °F (37.1 °C)] 98.4 °F (36.9 °C)  Pulse:  [55-86] 55  Resp:  [17-20] 20  SpO2:  [97 %-100 %] 99 %  BP: (110-128)/(55-74) 115/55     Body mass index is 22.47 kg/m².            Drains          No matching active lines, drains, or airways          Physical Exam   Constitutional: She is oriented to person, place, and time. She appears well-developed and well-nourished. No distress.   HENT:   Head: Normocephalic and atraumatic.   Cardiovascular: Normal rate.    Pulmonary/Chest: Effort normal. No respiratory distress.   Abdominal: She exhibits no distension. There is no tenderness. There is no CVA tenderness.   Neurological: She is alert and oriented to person, place, and time.   Skin: She is not diaphoretic.     Psychiatric: She has a normal mood and affect.       Significant Labs:    BMP:  No results for input(s): NA, K, CL, CO2, BUN, CREATININE, LABGLOM, GLUCOSE, CALCIUM in the last 168 hours.    CBC:   No results for input(s): WBC, HGB, HCT, PLT in the last 168 hours.    Urine Studies:   Recent Labs  Lab 01/19/18  1310   COLORU Martha   SPECGRAV 1.020   NITRITE Negative*   UROBILINOGEN 0.2   LEUKOCYTESUR Negative*       Significant Imaging:  All pertinent imaging results/findings from the past 24 hours have been reviewed.

## 2018-01-20 NOTE — ANESTHESIA PREPROCEDURE EVALUATION
01/20/2018  Sabina Clements is a healthy 27 y.o., female with R ureteral stone presenting for ureteroscopy with extraction/stent.     Anesthesia Evaluation    I have reviewed the Patient Summary Reports.     I have reviewed the Medications.     Review of Systems  Anesthesia Hx:  No problems with previous Anesthesia Denies Hx of Anesthetic complications  Neg history of prior surgery.  Denies Personal Hx of Anesthesia complications.   Hematology/Oncology:  Hematology Normal        Cardiovascular:  Cardiovascular Normal     Pulmonary:  Pulmonary Normal    Renal/:   renal calculi    Hepatic/GI:  Hepatic/GI Normal    Endocrine:  Endocrine Normal        Physical Exam  General:  Well nourished    Airway/Jaw/Neck:  Airway Findings: Mouth Opening: Normal Tongue: Normal  General Airway Assessment: Adult  Mallampati: I  TM Distance: Normal, at least 6 cm  Jaw/Neck Findings:  Neck ROM: Normal ROM      Dental:  Dental Findings: In tact   Chest/Lungs:  Chest/Lungs Findings: Clear to auscultation     Heart/Vascular:  Heart Findings: Rate: Normal  Heart murmur: negative       Mental Status:  Mental Status Findings:  Cooperative, Alert and Oriented         Anesthesia Plan  Type of Anesthesia, risks & benefits discussed:  Anesthesia Type:  MAC, general  Patient's Preference:   Intra-op Monitoring Plan: standard ASA monitors  Intra-op Monitoring Plan Comments:   Post Op Pain Control Plan:   Post Op Pain Control Plan Comments:   Induction:   IV  Beta Blocker:  Patient is not currently on a Beta-Blocker (No further documentation required).       Informed Consent: Patient understands risks and agrees with Anesthesia plan.  Questions answered. Anesthesia consent signed with patient.  ASA Score: 1     Day of Surgery Review of History & Physical:    H&P update referred to the surgeon.         Ready For Surgery From Anesthesia  Perspective.

## 2018-01-20 NOTE — TRANSFER OF CARE
"Anesthesia Transfer of Care Note    Patient: Sabina Clements    Procedure(s) Performed: Procedure(s) (LRB):  URETEROSCOPY (Right)  CYSTOSCOPY (N/A)  PYELOGRAM-RETROGRADE (Bilateral)  LITHOTRIPSY-LASER (Right)  PLACEMENT-STENT (Bilateral)  EXTRACTION - STONE (Right)    Patient location: PACU    Anesthesia Type: general    Transport from OR: Transported from OR on 6-10 L/min O2 by face mask with adequate spontaneous ventilation    Post pain: adequate analgesia    Post assessment: no apparent anesthetic complications and tolerated procedure well    Post vital signs: stable    Level of consciousness: sedated    Nausea/Vomiting: no nausea/vomiting    Complications: none    Transfer of care protocol was followed      Last vitals:   Visit Vitals  BP (!) 99/55 (BP Location: Right arm)   Pulse (!) 57   Temp 36.6 °C (97.8 °F) (Oral)   Resp 18   Ht 5' 5" (1.651 m)   Wt 61.2 kg (135 lb)   LMP 01/14/2018   SpO2 99%   Breastfeeding? No   BMI 22.47 kg/m²     "

## 2018-01-20 NOTE — ANESTHESIA POSTPROCEDURE EVALUATION
"Anesthesia Post Evaluation    Patient: Sabina Clements    Procedure(s) Performed: Procedure(s) (LRB):  URETEROSCOPY (Right)  CYSTOSCOPY (N/A)  PYELOGRAM-RETROGRADE (Bilateral)  LITHOTRIPSY-LASER (Right)  PLACEMENT-STENT (Bilateral)  EXTRACTION - STONE (Right)    Final Anesthesia Type: general  Patient location during evaluation: PACU  Patient participation: Yes- Able to Participate  Level of consciousness: awake and alert  Post-procedure vital signs: reviewed and stable  Pain management: adequate  Airway patency: patent  PONV status at discharge: No PONV  Anesthetic complications: no      Cardiovascular status: blood pressure returned to baseline  Respiratory status: unassisted  Hydration status: euvolemic  Follow-up not needed.        Visit Vitals  /66   Pulse 62   Temp 36.9 °C (98.5 °F) (Oral)   Resp 16   Ht 5' 5" (1.651 m)   Wt 61.2 kg (135 lb)   LMP 01/14/2018   SpO2 100%   Breastfeeding? No   BMI 22.47 kg/m²       Pain/Sandra Score: Pain Assessment Performed: Yes (1/20/2018  9:10 AM)  Presence of Pain: denies (1/20/2018  9:10 AM)  Pain Rating Prior to Med Admin: 0 (1/20/2018  6:04 AM)  Sandra Score: 10 (1/20/2018  9:30 AM)      "

## 2018-01-20 NOTE — ASSESSMENT & PLAN NOTE
- right hydroureteronephrosis likely 2/2 right ureteral stone   - left hydronephrosis likely 2/2 UPJ-O  - NPO  - IVF  - pain control  - nausea control   - ambulate TID  - TEDs/SCDs    To OR today for right ureteroscopy, possible laser lithotripsy, stone extraction, stent insertion, left ureteral stent insertion.  Home afterward.

## 2018-01-20 NOTE — OP NOTE
[facility]  Operative Note    Date: 01/20/2018    Pre-Op Diagnosis:     1.  Right ureteral stone  2.  Right hydronephrosis  3.  Non-obstructing right renal stones   4.  Left hydronephrosis, possible left UPJ obstruction       Post-Op Diagnosis: same    Procedure(s) Performed:   1.  Right ureteroscopy  2.  Cystoscopy  3.  Laser lithotripsy   4.  Stone extraction   5.  Right ureteral stent insertion   6.  Left retrograde pyelogram   7.  Left ureteral stent insertion   8.  Fluoro < 1 h    Specimen(s): right ureteral stone for analysis     Staff Surgeon: Agus Wynne MD    Assistant Surgeon: Marco A Jenkins MD; Ariana Miller MD    Anesthesia: General endotracheal anesthesia    Indications: Sabina Clements is a 27 y.o. female with no significant PMHx who presented to the ED with acute right flank pain and was found to have a 5 mm mid ureteral stone on the right, multiple non-obstructing right renal stones, and left hydronephrosis concerning for left UPJ obstruction.      Findings:     - right mid-ureteral stone fragmented with laser and basketed  - right ureter tortuous   - right ureteral stent placed  - left retrograde pyelogram demonstrated normal appearing ureter with narrowed, low inserting UPJ with severe left hydronephrosis  - left ureteral stent placed     1 baskets were used throughout the case.      Estimated Blood Loss: min    Drains:    Right 6 Fr x 24 cm JJ ureteral stent without strings  Left  6 Fr x 24 cm JJ ureteral stent without strings    Procedure in detail:  After informed consent was obtained, the patient was brought the the cystoscopy suite and placed in the supine position.  SCDs were applied and working.  Anesthesia was administered.  The patient was then placed in the dorsal lithotomy position and prepped and draped in the usual sterile fashion.      A rigid cystoscope in a 22 Fr sheath was introduced into the patient's urethra.  This passed easily.  The entire urethra was  visualized which showed no strictures or masses.  Formal cystoscopy was performed which revealed no masses or lesions suspicious for malignancy, no bladder stones, no bladder diverticuli, no trabeculations.  The ureteral orifices were visualized in the normal anatomic position bilaterally.     A motion wire was passed up the right ureteral orifice and up into the kidney.  This passed easily and placement was confirmed using fluoro.  The cystoscope was removed keeping the guidewire in place.    An 8 Fr rigid ureteroscope was passed into the patient's bladder alongside the wire under direct vision.  It was then passed through the right ureteral orifice alongside the wire.  A stone was encountered at the level of the mid ureter.  A 365 micron laser fiber was passed through the ureteroscope.  The stone was fragmented using the laser.  The laser fiber was removed and a Nitinol tipless basket was introduced through the ureteroscope.  Stone fragments were removed and sent for analysis.  The ureteroscope was advanced to the UPJ and no further ureteral stones were identified.  The ureteroscope was removed keeping the motion wire in place.      A 6 Fr x 24 cm JJ ureteral stent without strings was passed over the wire and up into the right renal pelvis using fluoro.  When the coil appeared to be in good position in the kidney and the radio-opaque marker of the pusher was at the inferior pubis, the wire was removed under continuous fluoro.  Good coils were seen in the kidney and the bladder using fluoro.      We then turned our attention to the left ureter.  A retrograde pyelogram was performed demonstrating a normal appearing ureter with narrowed, low inserting UPJ with severe left hydronephrosis.  A motion wire was inserted through the left UO into the left renal pelvis and confirmed with fluoro.      A 6 Fr x 24 cm JJ ureteral stent without strings was passed through the scope, over the wire, and up into the left renal  pelvis using fluoro.  When the coil appeared to be in good position in the kidney and the pusher was at the bladder neck, the wire was removed under continuous fluoro.  Good coils were seen in the kidney and the bladder using fluoro.     The patient tolerated the procedure well and was transferred to the recovery room in stable condition.      Disposition:  The patient will follow up with Dr. Wynne in 2 weeks with Mag 3 renal scan at which point management of her right renal stones and left UPJ-O will be determined.      Marco A Jenkins MD    As the attending surgeon, Dr. Wynne was present for and performed all key aspects of the operation.

## 2018-01-20 NOTE — PLAN OF CARE
Problem: Patient Care Overview  Goal: Plan of Care Review  Outcome: Ongoing (interventions implemented as appropriate)  Pt AAOx4 and VSS. Pt is progressing with plan of care. Free of skin breakdown as the pt positioned/repositioned moves well independently. No complain of pain at this time. Frequent rounds made to assess pain and safety and no complaints at this time noted. Side rails up x 2. Bed locked. Call light within reach. No falls noted. Will continue to monitor.      .

## 2018-01-20 NOTE — NURSING
Report received. Care assumed. Patient arrived via stretcher AAOx4. Family at bedside. Pt lying supine in bed. Pt denies pain or any other concerns at this time. See assessment. Patient oriented to room. Side rails up x2. Bed in the lowest position and bed wheels locked. Call light within reach. Will continue to monitor.

## 2018-01-20 NOTE — NURSING TRANSFER
Nursing Transfer Note      1/20/2018     Transfer to 501    Transfer via stretcher    Transfer with ticket to ride    Transported by escort    Medicines sent: none    Chart send with patient:yes    Notified :family members    Patient reassessed at: 1/20/2018 0915

## 2018-01-20 NOTE — ASSESSMENT & PLAN NOTE
- likely 2/2 right ureteral stone   - left hydronephrosis likely 2/2 UPJ-O  - admit to urology   - regular diet, NPO MN  - IVF  - pain control  - nausea control   - ambulate TID  - TEDs/SCDs    To OR tomorrow for right ureteroscopy, possible laser lithotripsy, stone extraction, stent insertion

## 2018-01-20 NOTE — DISCHARGE SUMMARY
[facility]  Urology  Discharge Summary      Patient Name: Sabina Clements  MRN: [id number]  Admission Date: 1/19/2018  Hospital Length of Stay: 0 days  Discharge Date and Time:  01/20/2018 8:32 AM  Attending Physician: Agus Wynne MD   Discharging Provider: Marco A Jenkins MD  Primary Care Physician: Primary Doctor No    HPI: 27 YOF with right ureteral stone and non-obstructing right renal stones as well as concern for left UPJ obstruction and possible non-functioning left kidney.     Procedure(s) (LRB):  URETEROSCOPY (Right)  CYSTOSCOPY (N/A)  PYELOGRAM-RETROGRADE (Bilateral)  LITHOTRIPSY-LASER (Right)  PLACEMENT-STENT (Bilateral)  EXTRACTION - STONE (Right)     Indwelling Lines/Drains at time of discharge:   Lines/Drains/Airways          No matching active lines, drains, or airways          Hospital Course (synopsis of major diagnoses, care, treatment, and services provided during the course of the hospital stay):     Patient was admitted on 1/19/2018 from the ED.  She was taken to the OR the next day for above procedure.  Patient tolerated the procedure well, hospital course was uncomplicated, and patient was discharged home in good condition with pain well controlled on 1/20/2018 after patient ambulated, voided, and tolerated regular diet.      Consults:     Significant Diagnostic Studies: CT RSS    Pending Diagnostic Studies:     Procedure Component Value Units Date/Time    X-Ray Abdomen AP 1 View [[id number]]     Order Status:  Sent Lab Status:  In process           Final Active Diagnoses:    Diagnosis Date Noted POA    PRINCIPAL PROBLEM:  Ureteral stone [N20.1] 01/19/2018 Yes    Ureteropelvic junction (UPJ) obstruction, left [N13.5] 01/20/2018 Yes    Nephrolithiasis [N20.0] 01/20/2018 Yes    Hydroureteronephrosis, right [N13.30] 01/19/2018 Yes      Problems Resolved During this Admission:    Diagnosis Date Noted Date Resolved POA       Discharged Condition: good    Disposition: Home  or Self Care    Follow Up:  Follow-up Information     Agus Wynne MD In 2 weeks.    Specialty:  Urology  Why:  post op right ureteroscopy for ureteral stone, bilateral ureteral stent insertion, left UPJ-O  Contact information:  [address]  [facility] [id number]  [phone]                 Patient Instructions:     Call MD for:  temperature >100.4     Call MD for:  persistent nausea and vomiting or diarrhea     Call MD for:  severe uncontrolled pain     Call MD for:  redness, tenderness, or signs of infection (pain, swelling, redness, odor or green/yellow discharge around incision site)     Call MD for:  difficulty breathing or increased cough     Call MD for:  severe persistent headache     Call MD for:  worsening rash     Call MD for:  persistent dizziness, light-headedness, or visual disturbances     Call MD for:  increased confusion or weakness     No dressing needed       Medications:  Reconciled Home Medications:   Current Discharge Medication List      START taking these medications    Details   hyoscyamine (LEVSIN/SL) 0.125 mg Subl Place 1 tablet (0.125 mg total) under the tongue every 4 (four) hours as needed (bladder spasms).  Qty: 20 tablet, Refills: 1      oxyCODONE-acetaminophen (PERCOCET) 5-325 mg per tablet Take 1 tablet by mouth every 6 (six) hours as needed.  Qty: 15 tablet, Refills: 0      polyethylene glycol (GLYCOLAX) 17 gram PwPk Take 17 g by mouth once daily.  Qty: 30 packet, Refills: 0      tamsulosin (FLOMAX) 0.4 mg Cp24 Take 1 capsule (0.4 mg total) by mouth once daily.  Qty: 30 capsule, Refills: 0         STOP taking these medications       hydrocodone-acetaminophen 5-325mg (NORCO) 5-325 mg per tablet Comments:   Reason for Stopping:               Time spent on the discharge of patient: 30 minutes    Marco A Jenkins MD  Urology  [facility]

## 2018-01-21 LAB — BACTERIA UR CULT: NORMAL

## 2018-01-26 LAB
ANNOTATION COMMENT IMP: NORMAL
COMPN STONE: NORMAL
SPECIMEN SOURCE: NORMAL
STONE ANALYSIS IR-IMP: NORMAL

## 2018-01-31 ENCOUNTER — OFFICE VISIT (OUTPATIENT)
Dept: UROLOGY | Facility: CLINIC | Age: 28
End: 2018-01-31
Payer: COMMERCIAL

## 2018-01-31 VITALS
HEIGHT: 65 IN | DIASTOLIC BLOOD PRESSURE: 70 MMHG | SYSTOLIC BLOOD PRESSURE: 126 MMHG | HEART RATE: 69 BPM | BODY MASS INDEX: 20.16 KG/M2 | RESPIRATION RATE: 15 BRPM | WEIGHT: 121 LBS

## 2018-01-31 DIAGNOSIS — Q62.11 HYDRONEPHROSIS WITH URETEROPELVIC JUNCTION (UPJ) OBSTRUCTION: ICD-10-CM

## 2018-01-31 DIAGNOSIS — N20.0 NEPHROLITHIASIS: ICD-10-CM

## 2018-01-31 DIAGNOSIS — N13.5 URETEROPELVIC JUNCTION (UPJ) OBSTRUCTION, LEFT: Primary | ICD-10-CM

## 2018-01-31 PROCEDURE — 3008F BODY MASS INDEX DOCD: CPT | Mod: S$GLB,,, | Performed by: UROLOGY

## 2018-01-31 PROCEDURE — 99999 PR PBB SHADOW E&M-EST. PATIENT-LVL III: CPT | Mod: PBBFAC,,, | Performed by: UROLOGY

## 2018-01-31 PROCEDURE — 99214 OFFICE O/P EST MOD 30 MIN: CPT | Mod: S$GLB,,, | Performed by: UROLOGY

## 2018-01-31 NOTE — PATIENT INSTRUCTIONS
When Your Child Has Hydronephrosis     With hydronephrosis, a problem in the urinary tract keeps urine from draining properly, causing the kidneys to fill with urine.   One or more of your childs kidneys is enlarged because of urine backup. This is called hydronephrosis. The problem may have been diagnosed before your child was even born. Often, the condition is not serious. In fact, in many children the problem goes away with time. In some cases, treatment is needed. Your childs healthcare provider can tell you about treatment options. Your child may see a pediatric urologist. This is a doctor who manages problems of the urinary tract in children.  What is hydronephrosis?  Hydronephrosis is swelling of a kidney due to a backup of urine. It may be mild, moderate, or severe.  What causes hydronephrosis?  There can be several causes of urine backup. There may be a blockage in the urinary tract that does not let urine flow normally. Urine may flow the wrong way (reflux) back up to the kidneys. Or urine may drain too slowly down from the kidneys. These problems can lead to a swollen kidney and may cause permanent damage to the kidney in the long term. Tests can be done to find the cause of your child's condition. The healthcare provider will tell you more. Uncommonly, the blockage may be within the kidney itself.  How is hydronephrosis diagnosed?  A swollen kidney may be seen on ultrasounds done during pregnancy. Once the baby is born, he or she may have a test to confirm hydronephrosis. This test is called a renal (kidney) ultrasound. Urine that does not flow normally can lead to a urinary tract infection (UTI). A renal ultrasound test may be done if a baby or child has a UTI.  How is hydronephrosis treated?  Treatment depends on the cause of the urine backup. It also depends on how bad the problem is. A mild problem may go away on its own without treatment. If the problem was found with prenatal ultrasound,  treatment may wait until the baby is born. The goal of treatment is to protect the childs kidneys as he or she grows. Your childs healthcare provider can talk with you about how best to treat your child. Your child may need:  · Follow-up ultrasounds to monitor kidney health  · Surgery to improve urine flow if the problem is severe  What are the long-term concerns?  A mild case of hydronephrosis may cause no problems. But a severe case or one that gets worse can lead to kidney damage. Your childs condition will be watched closely. If needed, treatment can be done to prevent long-term problems.  Date Last Reviewed: 12/1/2016  © 4913-3929 [employer]. [address] [place] [address], [place] [address]. All rights reserved. This information is not intended as a substitute for professional medical care. Always follow your healthcare professional's instructions.

## 2018-01-31 NOTE — PROGRESS NOTES
Subjective:       Patient ID: Sabina Clements is a 27 y.o. female.    Chief Complaint: Ureteral junction obstruction (f/u)      HPI: Sabina Clements is a 27 y.o. Black or  female who returns today in follow-up for evaluation and management of nephrolithiasis and left hydronephrosis, likely a left UPJ obstruction.    The patient initially presented to the emergency room on 1/19/18 with right nephrolithiasis and right flank pain. She was found to have a distal obstructing right ureteral stone as well as severe left hydronephrosis of the left renal pelvis. It appeared that she had an unrecognized left UPJ obstruction in addition to her right stone disease.    The patient was admitted and taken for right ureteroscopy and basket extraction of her stone. A left retrograde pyelogram was done. Bilateral JJ stents were placed. The patient has done well since her procedure.    She presents today to discuss the findings of her procedure as well as the plan for her left-sided hydronephrosis.    The patient is doing well with minimal urologic complaints.    Review of patient's allergies indicates:  No Known Allergies    Current Outpatient Prescriptions   Medication Sig Dispense Refill    hyoscyamine (LEVSIN/SL) 0.125 mg Subl Place 1 tablet (0.125 mg total) under the tongue every 4 (four) hours as needed (bladder spasms). 20 tablet 1    oxyCODONE-acetaminophen (PERCOCET) 5-325 mg per tablet Take 1 tablet by mouth every 6 (six) hours as needed. 15 tablet 0    polyethylene glycol (GLYCOLAX) 17 gram PwPk Take 17 g by mouth once daily. 30 packet 0    tamsulosin (FLOMAX) 0.4 mg Cp24 Take 1 capsule (0.4 mg total) by mouth once daily. 30 capsule 0     No current facility-administered medications for this visit.        History reviewed. No pertinent past medical history.    History reviewed. No pertinent surgical history.    History reviewed. No pertinent family history.    Review of Systems    Review of Systems    Constitutional: Negative for fever, chills, activity change, appetite change and unexpected weight change.   HENT: Negative for congestion, nosebleeds, sneezing, sore throat and trouble swallowing.    Eyes: Negative for pain, discharge, redness and visual disturbance.   Respiratory: Negative for cough, choking, chest tightness and shortness of breath.    Cardiovascular: Negative for chest pain, palpitations and leg swelling.   Gastrointestinal: Negative for nausea, vomiting, abdominal pain, diarrhea, blood in stool, abdominal distention and anal bleeding.  Genitourinary: As documented per HPI   Endocrine: Negative for cold intolerance, heat intolerance, polydipsia, polyphagia and polyuria.   Musculoskeletal: Negative for myalgias, gait problem, neck pain and neck stiffness.   Skin: Negative for color change, pallor, rash and wound.   Allergic/Immunologic: Negative for immunocompromised state.   Neurological: Negative for seizures, facial asymmetry, speech difficulty, weakness and light-headedness.   Hematological: Negative for adenopathy. Does not bruise/bleed easily.   Psychiatric/Behavioral: Negative for hallucinations, behavioral problems, self-injury and agitation. The patient is not hyperactive.      All other systems were reviewed and were negative.    Objective:     Vitals:    01/31/18 1514   BP: 126/70   Pulse: 69   Resp: 15     Physical Exam   Vitals reviewed.  Constitutional: She is oriented to person, place, and time. She appears well-developed and well-nourished. No distress.   HENT:   Head: Normocephalic and atraumatic.   Right Ear: External ear normal.   Left Ear: External ear normal.   Nose: Nose normal.   Eyes: EOM are normal. Pupils are equal, round, and reactive to light. Right eye exhibits no discharge. Left eye exhibits no discharge.   Neck: Normal range of motion. Neck supple. No tracheal deviation present. No thyroid enlargement or tenderness.  Cardiovascular: Normal heart sounds and intact  distal pulses. No signs of peripheral edema.    Pulmonary/Chest: Effort normal and breath sounds normal. No respiratory distress. She has no wheezes.   Abdominal: Soft. Bowel sounds are normal. She exhibits no distension. There is no tenderness. There is no rebound. No hepatic or splenic enlargement.   Musculoskeletal: Normal range of motion. She exhibits no edema.   Neurological: She is alert and oriented to person, place, and time. Coordination normal.   Skin: Skin is warm and dry. She is not diaphoretic.   Lymphatic: No palpable lymph nodes.  Psychiatric: She has a normal mood and affect. Her behavior is normal. Judgment and thought content normal.     No results found for: CREATININE  No results found for: EGFRNONAA  No results found for: ESTGFRAFRICA    I have personally reviewed all the patient's relevant  imaging.    CT renal stone study (1/19/18): Right-sided hydroureteronephrosis likely secondary to a 4-5 mm calculus within the distal right ureter. Additional multiple right-sided nephroliths. Moderate to severe left-sided hydronephrosis with marked dilatation of the left renal pelvis but no associated left-sided hydroureter, discrete mechanical cause or radiodense calcifications seen within the left collecting system. 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    Assessment:       1. Ureteropelvic junction (UPJ) obstruction, left    2. Nephrolithiasis    3. Hydronephrosis with ureteropelvic junction (UPJ) obstruction        Plan:     Sabina was seen today for ureteral junction obstruction.    Diagnoses and all orders for this visit:    Ureteropelvic junction (UPJ) obstruction, left    Nephrolithiasis    Hydronephrosis with ureteropelvic junction (UPJ) obstruction  -     NM Renogram With Lasix; Future    The patient is doing well since her procedure.    She has bilateral JJ stents in place.    We discussed  the findings in her left kidney and that they may represent a left UPJ obstruction. We discussed what a UPJ obstruction is and how it would be treated. She will need to see Dr. Reynoso regarding this finding after a renal scan is performed. After the renal scan, she will return to see me, and then we will plan on how best to proceed.    I answered all her questions.    I encouraged her or any of her family members to call or email me with questions and/or concerns.    I spent 30 minutes with the patient of which more than half was spent in coordinating the patient's care as well as in direct consultation with the patient in regards to our treatment and plan.

## 2018-02-07 ENCOUNTER — HOSPITAL ENCOUNTER (OUTPATIENT)
Dept: RADIOLOGY | Facility: HOSPITAL | Age: 28
Discharge: HOME | End: 2018-02-07
Attending: UROLOGY
Payer: COMMERCIAL

## 2018-02-07 DIAGNOSIS — Q62.11 HYDRONEPHROSIS WITH URETEROPELVIC JUNCTION (UPJ) OBSTRUCTION: ICD-10-CM

## 2018-02-07 PROCEDURE — A9562 TC99M MERTIATIDE: HCPCS

## 2018-02-07 PROCEDURE — 78708 K FLOW/FUNCT IMAGE W/DRUG: CPT | Mod: 26,,, | Performed by: RADIOLOGY

## 2018-02-07 PROCEDURE — 78805 NM RENOGRAM WITH LASIX: CPT | Mod: TC

## 2018-02-07 PROCEDURE — 63600175 PHARM REV CODE 636 W HCPCS: Performed by: UROLOGY

## 2018-02-07 RX ORDER — FUROSEMIDE 10 MG/ML
40 INJECTION INTRAMUSCULAR; INTRAVENOUS
Status: COMPLETED | OUTPATIENT
Start: 2018-02-07 | End: 2018-02-07

## 2018-02-07 RX ADMIN — FUROSEMIDE 40 MG: 10 INJECTION, SOLUTION INTRAMUSCULAR; INTRAVENOUS at 01:02

## 2018-02-12 ENCOUNTER — TELEPHONE (OUTPATIENT)
Dept: UROLOGY | Facility: CLINIC | Age: 28
End: 2018-02-12

## 2018-02-12 ENCOUNTER — OFFICE VISIT (OUTPATIENT)
Dept: UROLOGY | Facility: CLINIC | Age: 28
End: 2018-02-12
Payer: COMMERCIAL

## 2018-02-12 VITALS
DIASTOLIC BLOOD PRESSURE: 62 MMHG | WEIGHT: 121.25 LBS | HEART RATE: 68 BPM | SYSTOLIC BLOOD PRESSURE: 123 MMHG | HEIGHT: 65 IN | BODY MASS INDEX: 20.2 KG/M2

## 2018-02-12 DIAGNOSIS — N20.0 NEPHROLITHIASIS: ICD-10-CM

## 2018-02-12 DIAGNOSIS — Q62.39 UPJ OBSTRUCTION, CONGENITAL: ICD-10-CM

## 2018-02-12 DIAGNOSIS — N20.0 NEPHROLITHIASIS: Primary | ICD-10-CM

## 2018-02-12 DIAGNOSIS — N13.5 URETEROPELVIC JUNCTION (UPJ) OBSTRUCTION, LEFT: Primary | ICD-10-CM

## 2018-02-12 LAB
BACTERIA #/AREA URNS AUTO: ABNORMAL /HPF
BILIRUB UR QL STRIP: NEGATIVE
CLARITY UR REFRACT.AUTO: ABNORMAL
COLOR UR AUTO: YELLOW
GLUCOSE UR QL STRIP: NEGATIVE
HGB UR QL STRIP: ABNORMAL
HYALINE CASTS UR QL AUTO: 0 /LPF
KETONES UR QL STRIP: NEGATIVE
LEUKOCYTE ESTERASE UR QL STRIP: ABNORMAL
MICROSCOPIC COMMENT: ABNORMAL
NITRITE UR QL STRIP: NEGATIVE
PH UR STRIP: 6 [PH] (ref 5–8)
PROT UR QL STRIP: ABNORMAL
RBC #/AREA URNS AUTO: >100 /HPF (ref 0–4)
SP GR UR STRIP: 1.01 (ref 1–1.03)
SQUAMOUS #/AREA URNS AUTO: 0 /HPF
URN SPEC COLLECT METH UR: ABNORMAL
UROBILINOGEN UR STRIP-ACNC: NEGATIVE EU/DL
WBC #/AREA URNS AUTO: 20 /HPF (ref 0–5)

## 2018-02-12 PROCEDURE — 99215 OFFICE O/P EST HI 40 MIN: CPT | Mod: S$GLB,,, | Performed by: UROLOGY

## 2018-02-12 PROCEDURE — 87086 URINE CULTURE/COLONY COUNT: CPT

## 2018-02-12 PROCEDURE — 99999 PR PBB SHADOW E&M-EST. PATIENT-LVL III: CPT | Mod: PBBFAC,,, | Performed by: UROLOGY

## 2018-02-12 PROCEDURE — 81001 URINALYSIS AUTO W/SCOPE: CPT

## 2018-02-12 PROCEDURE — 3008F BODY MASS INDEX DOCD: CPT | Mod: S$GLB,,, | Performed by: UROLOGY

## 2018-02-12 NOTE — PROGRESS NOTES
Subjective:       Patient ID: Sabina Clements is a 27 y.o. female.    Chief Complaint:  No chief complaint on file.      History of Present Illness  HPI  Patient is a 27 y.o. female who is established to our clinic and was initially referred byDr. Wynne for evaluation of kidney stones and upj obstruction.    She underwent a right ureteroscopy for distal right ureteral stone in January.  She also had an incidental finding on CT scan of the likely left UPJ obstruction.  She had bilateral ureteral stents placed at that time.  Of note, she was asymptomatic on the left at the time of presentation.    She denies fevers or chills.  No nausea or vomiting.  No other complaints today.       Review of Systems  Review of Systems  All other systems reviewed and negative except pertinent positives noted in HPI.       Objective:     Physical Exam   Constitutional: She is oriented to person, place, and time. She appears well-developed and well-nourished. She is cooperative.  Non-toxic appearance.   HENT:   Head: Normocephalic.   Cardiovascular: Normal rate, intact distal pulses and normal pulses.    Pulmonary/Chest: Effort normal. No accessory muscle usage. No tachypnea. No respiratory distress.   Abdominal: Soft. Normal appearance. She exhibits no distension, no fluid wave, no ascites and no mass. There is no tenderness. There is no rebound and no CVA tenderness. No hernia.   Liver/spleen non-palpable   Musculoskeletal: Normal range of motion.   Neurological: She is alert and oriented to person, place, and time. She has normal strength.   Skin: No bruising and no rash noted.     Psychiatric: She has a normal mood and affect. Her speech is normal and behavior is normal. Thought content normal.       Lab Review  Lab Results   Component Value Date    COLORU Martha 01/19/2018    SPECGRAV 1.020 01/19/2018    NITRITE Negative (NG) 01/19/2018    PROTEINUR 7.8 01/19/2018    UROBILINOGEN 0.2 01/19/2018    BILIRUBINUR 0.8 01/19/2018          Assessment:        1. Nephrolithiasis    2. UPJ obstruction, congenital          Plan:     Nephrolithiasis  -     Urinalysis  -     Urine culture    UPJ obstruction, congenital      -I have explained the indication, risks, benefits, and alternatives of the procedure in detail.  Risks including but not limited to bleeding, infection, injury to the urethra, bladder, ureter, need for additional treatments, inability or incomplete removal of kidney stones, pain, and discomfort related to the stent were discussed in depth with the patient.  The patient voices understanding and all questions have been answered.  The patient agrees to proceed as planned with right ureteroscopy, laser lithotripsy, and ureteral stent placement.  We will remove the left ureteral stent at the time of her procedure.  We will plan for this on February 23, 2018.      -I have explained the indication, risks, benefits, and alternatives of the procedure in detail.  The patient voices understanding and all questions have been answered.  The patient agrees to proceed as planned with left robotic pyeloplasty.  Plan for surgery on March 9, 2018.    I spent over 45 minutes with the patient. Over 50% of the visit was spent in counseling.

## 2018-02-13 LAB — BACTERIA UR CULT: NORMAL

## 2018-02-14 ENCOUNTER — ANESTHESIA EVENT (OUTPATIENT)
Dept: SURGERY | Facility: HOSPITAL | Age: 28
End: 2018-02-14
Payer: COMMERCIAL

## 2018-02-14 ENCOUNTER — TELEPHONE (OUTPATIENT)
Dept: UROLOGY | Facility: CLINIC | Age: 28
End: 2018-02-14

## 2018-02-14 NOTE — ANESTHESIA PREPROCEDURE EVALUATION
Pre Admission Screening  Jenny Pineda RN      []Hide copied text  Anesthesia Assessment: Preoperative EQUATION     Planned Procedure: Procedure(s) (LRB):  CYSTOSCOPY WITH STENT REMOVAL (Bilateral)  URETEROSCOPY (Right)  LITHOTRIPSY-LASER (Right)  Requested Anesthesia Type:General  Surgeon: Stan Reynoso MD  Service: Urology  Known or anticipated Date of Surgery:2/23/2018     Surgeon notes: reviewed     Electronic QUestionnaire Assessment completed via nurse interview with patient.         NO AQ     Triage considerations:      The patient has no apparent active cardiac condition (No unstable coronary Syndrome such as severe unstable angina or recent [<1 month] myocardial infarction, decompensated CHF, severe valvular   disease or significant arrhythmia)     Previous anesthesia records:GETA, MAC and No problems 01/20/18; Placement Time: 0734; Method of Intubation: Direct laryngoscopy; Inserted by: CRNA; Airway Device: Endotracheal Tube; Mask Ventilation: Easy - oral; Intubated: Postinduction; Blade: Haines #2; Airway Device Size: 7.5; Style: Cuffed; Cuff Inflation: Minimal occlusive pressure; Placement Verified By: Auscultation, Capnometry, ETT Condensation; Grade: Grade I; Complicating Factors: None; Intubation Findings: Positive EtCO2, Bilateral breath sounds, Atraumatic/Condition of teeth unchanged;  Depth of Insertion: 22; Securment: Lips; Complications: None; Breath Sounds: Equal Bilateral; Insertion Attempts: 1;   Airway/Jaw/Neck:  Airway Findings: Mouth Opening: Normal Tongue: Normal  General Airway Assessment: Adult  Mallampati: I  TM Distance: Normal, at least 6 cm  Jaw/Neck Findings:  Neck ROM: Normal ROM      Dental:  Dental Findings: In tact      Last PCP note: none  Subspecialty notes: n/a     Other important co-morbidities: none     Tests already available:  Available tests,  within 3 months . 1/19/18 CMP.                            Instructions given. (See in Nurse's note)     Optimization:   Anesthesia Preop Clinic Assessment  Indicated-not required for this procedure                Plan:    Testing:  none                           Patient  has previously scheduled Medical Appointment:none     Navigation:              Straight Line to surgery.                          No tests, anesthesia preop clinic visit, or consult required.                                                  Electronically signed by Jenny Pineda RN at 2/14/2018 10:16 AM        Pre-admit on 2/23/2018            Detailed Report                                                                                                                     02/14/2018  Sabina Clements is a 27 y.o., female.    Anesthesia Evaluation         Review of Systems  Anesthesia Hx:  No problems with previous Anesthesia History of prior surgery of interest to airway management or planning: Previous anesthesia: General 1/20/18 cystoscopy with general anesthesia.  Procedure performed at an [facility]. Denies Family Hx of Anesthesia complications.   Denies Personal Hx of Anesthesia complications.   Social:  Non-Smoker, Alcohol Use  Illicit Drug Use: Types of drugs include Marijuana,  drug use is current.  Hematology/Oncology:  Hematology Normal   Oncology Normal     EENT/Dental:EENT/Dental Normal   Cardiovascular:  Cardiovascular Normal Exercise tolerance: good  Denies Hypertension.  Denies MI.    Denies Angina. Jog/walks 2 miles   Pulmonary:  Pulmonary Normal  Denies COPD.  Denies Asthma.  Denies Shortness of breath.  Denies Recent URI.    Renal/:   renal calculi  Other Renal / Gu Conditions: (ureteropelvic junction obstruction)   Hepatic/GI:  Hepatic/GI Normal Denies Liver Disease.    Neurological:  Neurology Normal Denies TIA.  Denies CVA. Denies Seizures.    Endocrine:  Endocrine Normal Denies Diabetes.    Psych:  Psychiatric Normal              Anesthesia Plan  Type of Anesthesia, risks & benefits discussed:  Anesthesia Type:  general  Patient's  Preference:   Intra-op Monitoring Plan:   Intra-op Monitoring Plan Comments:   Post Op Pain Control Plan:   Post Op Pain Control Plan Comments: As per surgeon's plan  Induction:   IV  Beta Blocker:  Patient is not currently on a Beta-Blocker (No further documentation required).       Informed Consent: Patient understands risks and agrees with Anesthesia plan.  Questions answered. Anesthesia consent signed with patient.  ASA Score: 1     Day of Surgery Review of History & Physical:    H&P update referred to the surgeon.         Ready For Surgery From Anesthesia Perspective.

## 2018-02-14 NOTE — PRE ADMISSION SCREENING
Anesthesia Assessment: Preoperative EQUATION    Planned Procedure: Procedure(s) (LRB):  CYSTOSCOPY WITH STENT REMOVAL (Bilateral)  URETEROSCOPY (Right)  LITHOTRIPSY-LASER (Right)  Requested Anesthesia Type:General  Surgeon: Stan Reynoso MD  Service: Urology  Known or anticipated Date of Surgery:2/23/2018    Surgeon notes: reviewed    Electronic QUestionnaire Assessment completed via nurse interview with patient.        NO AQ    Triage considerations:     The patient has no apparent active cardiac condition (No unstable coronary Syndrome such as severe unstable angina or recent [<1 month] myocardial infarction, decompensated CHF, severe valvular   disease or significant arrhythmia)    Previous anesthesia records:GETA, MAC and No problems 01/20/18; Placement Time: 0734; Method of Intubation: Direct laryngoscopy; Inserted by: CRNA; Airway Device: Endotracheal Tube; Mask Ventilation: Easy - oral; Intubated: Postinduction; Blade: Haines #2; Airway Device Size: 7.5; Style: Cuffed; Cuff Inflation: Minimal occlusive pressure; Placement Verified By: Auscultation, Capnometry, ETT Condensation; Grade: Grade I; Complicating Factors: None; Intubation Findings: Positive EtCO2, Bilateral breath sounds, Atraumatic/Condition of teeth unchanged;  Depth of Insertion: 22; Securment: Lips; Complications: None; Breath Sounds: Equal Bilateral; Insertion Attempts: 1;   Airway/Jaw/Neck:  Airway Findings: Mouth Opening: Normal Tongue: Normal  General Airway Assessment: Adult  Mallampati: I  TM Distance: Normal, at least 6 cm  Jaw/Neck Findings:  Neck ROM: Normal ROM      Dental:  Dental Findings: In tact     Last PCP note: none  Subspecialty notes: n/a    Other important co-morbidities: none     Tests already available:  Available tests,  within 3 months . 1/19/18 CMP.            Instructions given. (See in Nurse's note)    Optimization:  Anesthesia Preop Clinic Assessment  Indicated-not required for this procedure       Plan:     Testing:  none     Patient  has previously scheduled Medical Appointment:none    Navigation:             Straight Line to surgery.               No tests, anesthesia preop clinic visit, or consult required.

## 2018-02-22 ENCOUNTER — TELEPHONE (OUTPATIENT)
Dept: UROLOGY | Facility: CLINIC | Age: 28
End: 2018-02-22

## 2018-02-23 ENCOUNTER — HOSPITAL ENCOUNTER (OUTPATIENT)
Facility: HOSPITAL | Age: 28
Discharge: HOME | End: 2018-02-23
Attending: UROLOGY | Admitting: UROLOGY
Payer: COMMERCIAL

## 2018-02-23 ENCOUNTER — SURGERY (OUTPATIENT)
Age: 28
End: 2018-02-23

## 2018-02-23 ENCOUNTER — ANESTHESIA (OUTPATIENT)
Dept: SURGERY | Facility: HOSPITAL | Age: 28
End: 2018-02-23
Payer: COMMERCIAL

## 2018-02-23 VITALS
OXYGEN SATURATION: 100 % | HEART RATE: 54 BPM | BODY MASS INDEX: 20.83 KG/M2 | WEIGHT: 125 LBS | SYSTOLIC BLOOD PRESSURE: 127 MMHG | RESPIRATION RATE: 15 BRPM | HEIGHT: 65 IN | DIASTOLIC BLOOD PRESSURE: 85 MMHG | TEMPERATURE: 98 F

## 2018-02-23 DIAGNOSIS — N20.1 URETERAL STONE: ICD-10-CM

## 2018-02-23 DIAGNOSIS — N20.0 NEPHROLITHIASIS: Primary | ICD-10-CM

## 2018-02-23 LAB
B-HCG UR QL: NEGATIVE
CTP QC/QA: YES

## 2018-02-23 PROCEDURE — 63600175 PHARM REV CODE 636 W HCPCS: Performed by: NURSE ANESTHETIST, CERTIFIED REGISTERED

## 2018-02-23 PROCEDURE — 82365 CALCULUS SPECTROSCOPY: CPT

## 2018-02-23 PROCEDURE — D9220A PRA ANESTHESIA: Mod: CRNA,,, | Performed by: NURSE ANESTHETIST, CERTIFIED REGISTERED

## 2018-02-23 PROCEDURE — C2617 STENT, NON-COR, TEM W/O DEL: HCPCS | Performed by: UROLOGY

## 2018-02-23 PROCEDURE — 81025 URINE PREGNANCY TEST: CPT | Performed by: ANESTHESIOLOGY

## 2018-02-23 PROCEDURE — 37000009 HC ANESTHESIA EA ADD 15 MINS: Performed by: UROLOGY

## 2018-02-23 PROCEDURE — 36000706: Performed by: UROLOGY

## 2018-02-23 PROCEDURE — 71000033 HC RECOVERY, INTIAL HOUR: Performed by: UROLOGY

## 2018-02-23 PROCEDURE — 25500020 PHARM REV CODE 255: Performed by: UROLOGY

## 2018-02-23 PROCEDURE — 36000707: Performed by: UROLOGY

## 2018-02-23 PROCEDURE — 25000003 PHARM REV CODE 250: Performed by: UROLOGY

## 2018-02-23 PROCEDURE — 52332 CYSTOSCOPY AND TREATMENT: CPT | Mod: 51,RT,, | Performed by: UROLOGY

## 2018-02-23 PROCEDURE — C1769 GUIDE WIRE: HCPCS | Performed by: UROLOGY

## 2018-02-23 PROCEDURE — C1758 CATHETER, URETERAL: HCPCS | Performed by: UROLOGY

## 2018-02-23 PROCEDURE — D9220A PRA ANESTHESIA: Mod: ANES,,, | Performed by: ANESTHESIOLOGY

## 2018-02-23 PROCEDURE — 74420 UROGRAPHY RTRGR +-KUB: CPT | Mod: 26,,, | Performed by: UROLOGY

## 2018-02-23 PROCEDURE — 37000008 HC ANESTHESIA 1ST 15 MINUTES: Performed by: UROLOGY

## 2018-02-23 PROCEDURE — 52352 CYSTOURETERO W/STONE REMOVE: CPT | Mod: RT,,, | Performed by: UROLOGY

## 2018-02-23 PROCEDURE — 76000 FLUOROSCOPY <1 HR PHYS/QHP: CPT | Mod: 26,59,, | Performed by: UROLOGY

## 2018-02-23 PROCEDURE — 25000003 PHARM REV CODE 250: Performed by: NURSE ANESTHETIST, CERTIFIED REGISTERED

## 2018-02-23 PROCEDURE — 27201423 OPTIME MED/SURG SUP & DEVICES STERILE SUPPLY: Performed by: UROLOGY

## 2018-02-23 PROCEDURE — 71000015 HC POSTOP RECOV 1ST HR: Performed by: UROLOGY

## 2018-02-23 DEVICE — STENT URETERAL UNIV 6FR 24CM: Type: IMPLANTABLE DEVICE | Site: URETER | Status: FUNCTIONAL

## 2018-02-23 RX ORDER — SODIUM CHLORIDE 9 MG/ML
INJECTION, SOLUTION INTRAVENOUS CONTINUOUS
Status: DISCONTINUED | OUTPATIENT
Start: 2018-02-23 | End: 2018-02-23 | Stop reason: HOSPADM

## 2018-02-23 RX ORDER — PROPOFOL 10 MG/ML
VIAL (ML) INTRAVENOUS
Status: DISCONTINUED | OUTPATIENT
Start: 2018-02-23 | End: 2018-02-23

## 2018-02-23 RX ORDER — MEPERIDINE HYDROCHLORIDE 25 MG/ML
12.5 INJECTION INTRAMUSCULAR; INTRAVENOUS; SUBCUTANEOUS EVERY 10 MIN PRN
Status: DISCONTINUED | OUTPATIENT
Start: 2018-02-23 | End: 2018-02-23 | Stop reason: HOSPADM

## 2018-02-23 RX ORDER — OXYCODONE AND ACETAMINOPHEN 5; 325 MG/1; MG/1
1 TABLET ORAL EVERY 4 HOURS PRN
Qty: 20 TABLET | Refills: 0 | Status: ON HOLD | OUTPATIENT
Start: 2018-02-23 | End: 2018-03-09

## 2018-02-23 RX ORDER — SODIUM CHLORIDE 0.9 % (FLUSH) 0.9 %
3 SYRINGE (ML) INJECTION
Status: DISCONTINUED | OUTPATIENT
Start: 2018-02-23 | End: 2018-02-23 | Stop reason: HOSPADM

## 2018-02-23 RX ORDER — FENTANYL CITRATE 50 UG/ML
INJECTION, SOLUTION INTRAMUSCULAR; INTRAVENOUS
Status: DISCONTINUED | OUTPATIENT
Start: 2018-02-23 | End: 2018-02-23

## 2018-02-23 RX ORDER — ROCURONIUM BROMIDE 10 MG/ML
INJECTION, SOLUTION INTRAVENOUS
Status: DISCONTINUED | OUTPATIENT
Start: 2018-02-23 | End: 2018-02-23

## 2018-02-23 RX ORDER — LIDOCAINE HCL/PF 100 MG/5ML
SYRINGE (ML) INTRAVENOUS
Status: DISCONTINUED | OUTPATIENT
Start: 2018-02-23 | End: 2018-02-23

## 2018-02-23 RX ORDER — MIDAZOLAM HYDROCHLORIDE 1 MG/ML
INJECTION, SOLUTION INTRAMUSCULAR; INTRAVENOUS
Status: DISCONTINUED | OUTPATIENT
Start: 2018-02-23 | End: 2018-02-23

## 2018-02-23 RX ORDER — CEFAZOLIN SODIUM 1 G/3ML
2 INJECTION, POWDER, FOR SOLUTION INTRAMUSCULAR; INTRAVENOUS
Status: DISCONTINUED | OUTPATIENT
Start: 2018-02-23 | End: 2018-02-23 | Stop reason: HOSPADM

## 2018-02-23 RX ORDER — FENTANYL CITRATE 50 UG/ML
50 INJECTION, SOLUTION INTRAMUSCULAR; INTRAVENOUS EVERY 5 MIN PRN
Status: DISCONTINUED | OUTPATIENT
Start: 2018-02-23 | End: 2018-02-23 | Stop reason: HOSPADM

## 2018-02-23 RX ORDER — ONDANSETRON 2 MG/ML
INJECTION INTRAMUSCULAR; INTRAVENOUS
Status: DISCONTINUED | OUTPATIENT
Start: 2018-02-23 | End: 2018-02-23

## 2018-02-23 RX ORDER — POLYETHYLENE GLYCOL 3350 17 G/17G
17 POWDER, FOR SOLUTION ORAL DAILY
Qty: 30 PACKET | Refills: 0 | Status: ON HOLD | OUTPATIENT
Start: 2018-02-23 | End: 2018-03-09

## 2018-02-23 RX ORDER — TAMSULOSIN HYDROCHLORIDE 0.4 MG/1
0.4 CAPSULE ORAL DAILY
Qty: 10 CAPSULE | Refills: 0 | Status: ON HOLD | OUTPATIENT
Start: 2018-02-23 | End: 2018-03-09

## 2018-02-23 RX ORDER — METOPROLOL TARTRATE 1 MG/ML
INJECTION, SOLUTION INTRAVENOUS
Status: DISCONTINUED | OUTPATIENT
Start: 2018-02-23 | End: 2018-02-23

## 2018-02-23 RX ADMIN — PROPOFOL 150 MG: 10 INJECTION, EMULSION INTRAVENOUS at 07:02

## 2018-02-23 RX ADMIN — SODIUM CHLORIDE: 0.9 INJECTION, SOLUTION INTRAVENOUS at 06:02

## 2018-02-23 RX ADMIN — FENTANYL CITRATE 100 MCG: 50 INJECTION, SOLUTION INTRAMUSCULAR; INTRAVENOUS at 07:02

## 2018-02-23 RX ADMIN — LIDOCAINE HYDROCHLORIDE 100 MG: 20 INJECTION, SOLUTION INTRAVENOUS at 07:02

## 2018-02-23 RX ADMIN — METOPROLOL TARTRATE 2 MG: 5 INJECTION, SOLUTION INTRAVENOUS at 07:02

## 2018-02-23 RX ADMIN — DEXTROSE 2 G: 50 INJECTION, SOLUTION INTRAVENOUS at 07:02

## 2018-02-23 RX ADMIN — ONDANSETRON 4 MG: 2 INJECTION INTRAMUSCULAR; INTRAVENOUS at 08:02

## 2018-02-23 RX ADMIN — MIDAZOLAM HYDROCHLORIDE 2 MG: 1 INJECTION, SOLUTION INTRAMUSCULAR; INTRAVENOUS at 07:02

## 2018-02-23 RX ADMIN — IOHEXOL 50 ML: 300 INJECTION, SOLUTION INTRAVENOUS at 07:02

## 2018-02-23 RX ADMIN — ROCURONIUM BROMIDE 40 MG: 10 INJECTION, SOLUTION INTRAVENOUS at 07:02

## 2018-02-23 RX ADMIN — SUGAMMADEX 225 MG: 100 INJECTION, SOLUTION INTRAVENOUS at 08:02

## 2018-02-23 NOTE — OP NOTE
[facility]  Operative Note    Date: 02/23/2018    Pre-Op Diagnosis: right ureteral stone and renal stone; left UPJO    Post-Op Diagnosis: same    Procedure(s) Performed:   1.  Right ureteroscopy  2.  Cystoscopy with right stent exchange and left stent removal.   3.  Left RGP  4.  Stone basket extraction  5.  Fluoro < 1 h    Specimen(s): stone for analysis     Staff Surgeon: Stan Reynoso MD    Assistant Surgeon: Iban Rodriguez MD    Anesthesia: General endotracheal anesthesia    Indications: Sabina Clements is a 27 y.o. female with right ureteral stone and renal stone and left UPJO who is s/p B stent placement.     Findings: 1. Left RGP performed which showed dilated collecting system and what appears to be a high insertion point with UPJO.  2. Right rigid ureteroscopy performed and stone basketed. Right flexible ureteroscopy performed and multiple small stones were basketed and removed.   3. Right 6 x 24 cm JJ ureteral stent with strings placed.     1 baskets were used throughout the case.      Estimated Blood Loss: min    Drains: 6 Fr x 24 cm JJ ureteral stent with strings    Procedure in detail:  After informed consent was obtained, the patient was brought the the cystoscopy suite and placed in the supine position.  SCDs were applied and working.  Anesthesia was administered.  The patient was then placed in the dorsal lithotomy position and prepped and draped in the usual sterile fashion.      A rigid cystoscope in a 22 Fr sheath was introduced into the patient's urethra.  This passed easily.  The entire urethra was visualized which showed no strictures or masses.  Formal cystoscopy was performed which revealed no masses or lesions suspicious for malignancy, no bladder stones, no bladder diverticuli, no trabeculations.  The ureteral orifices were visualized in the normal anatomic position bilaterally.      We began the procedure by using the alligator graspers and removing the left  ureteral stent. After this we then inserted a 5 fr open ended ureteral catheter and performed a left RGP. This revealed left UPJO with what appears to be a high insertion point. Blunted left collecting system.     Next, we turned our attention to the right UO. A glidewire was passed up the right ureteral orifice and up into the kidney.  This passed easily and placement was confirmed using fluoro.  The cystoscope was removed keeping the guidewire in place and the wire was secured to the drape.      An 8 Fr rigid ureteroscope was passed into the patient's bladder alongside the wire under direct vision.  It was then passed through the right ureteral orifice alongside the wire.  A stone was encountered at the level of mid ureter. Nitinol tipless basket was introduced through the ureteroscope. Stone was able to be basketed and removed from the ureter.     Next we placed a sensor wire through the rigid ureteroscope and both wires were kept in place. We then placed a 35 cm ureteral access sheath over the sensor wire. Flexible ureteroscopy was then performed and multiple small stone were removed from the renal pelvis and sent for specimen. We then removed the ureteral access sheath under direct visualization and no injury or defects were seen.     A 6 Fr x 24 cm JJ ureteral stent with strings was passed over the wire and up into the renal pelvis using fluoro.  When the coil appeared to be in good position in the kidney and the radio-opaque marker of the pusher was at the inferior pubis, the wire was removed under continuous fluoro.  Good coils were seen in the kidney and the bladder using fluoro.      The patient tolerated the procedure well and was transferred to the recovery room in stable condition.      Disposition:  The patient will follow up with Dr. Reynoso for left pyeloplasy on 3/9/18.      Iban Rodriguez MD

## 2018-02-23 NOTE — DISCHARGE INSTRUCTIONS
Please pull stent string on Tuesday. February 27, 2018     Ureteral Stents  A ureteral stent is a soft plastic tube with holes in it. Its temporarily inserted into a ureter to help drain urine into the bladder. One end goes in the kidney. The other end goes in the bladder. A coil on each end holds the stent in place. The stent cant be seen from outside the body. It shouldnt interfere with your normal routine. Your stent will be put in by a doctor trained in treating the urinary tract (a urologist) or another specialist. The procedure is done in a hospital or surgery center. Youll likely go home the same day.  When is a ureteral stent used?  A ureteral stent may be used:  · To bypass a blockage in a kidney or ureter.  · During kidney stone removal.  · To let a ureter heal after surgery.    Before the Procedure  Your healthcare provider will give you instructions to prepare for the procedure. X-rays or other imaging tests of your kidneys and ureters may be done beforehand.  During the procedure  · You receive medicine to prevent pain and help you relax or sleep during the procedure. Once this takes effect, the procedure starts.  · The doctor inserts a cystoscope (lighted instrument) through the urethra and into the bladder. This shows the opening to the ureter.  · A thin wire is carefully threaded through the cystoscope, up the ureter, and into the kidney. The stent is inserted over the wire.  · A fluoroscope (special X-ray machine) is used to help position the stent. When the stent is in place, the wire and cystoscope are removed.  While you have a stent  · Some discomfort is normal. Certain movements may trigger pain or a feeling that you need to urinate. You may also feel mild soreness or pressure before or during urination. These symptoms will go away a few days after the stent is removed.  · Medicine to control pain or bladder spasms or to prevent infection may be prescribed. Take this as directed.  · Drink  plenty of fluids to help flush out your urinary tract.  · Your urine may be slightly pink or red. This is due to bleeding caused by minor irritation from the stent. This may happen on and off while you have the stent.  · As with any synthetic device placed in the body, there is a risk of infection. The stent may have to be removed if this happens.   How long will you need a stent?  The stent is often taken out after the blockage in the ureter is treated or the ureter has healed. This may take 1 week to 2 weeks, or longer. If a stent is needed for a long time, it may need to be changed every few months.  When to call your healthcare provider  Contact your healthcare provider right away if:  · Your urine contains blood clots or you see a large amount of blood-tinged urine  · You have symptoms similar to those you had before the stent was placed  · You constantly leak urine  · You have a fever over 100.4°F (38°C), chills, nausea, or vomiting  · Your pain is not relieved with medicine  · The end of the stent comes out of the urethra   Date Last Reviewed: 1/1/2017  © 5056-8629 [employer]. [address] [place] [address], [place] [address]. All rights reserved. This information is not intended as a substitute for professional medical care. Always follow your healthcare professional's instructions.

## 2018-02-23 NOTE — BRIEF OP NOTE
[facility]  Brief Operative Note     SUMMARY     Surgery Date: 2/23/2018     Surgeon(s) and Role:     * Stan Reynoso MD - Primary     * Iban Rodriguez MD - Resident - Assisting        Pre-op Diagnosis:  Nephrolithiasis [N20.0]  Ureteropelvic junction (UPJ) obstruction, left [N13.5]    Post-op Diagnosis:  Post-Op Diagnosis Codes:     * Nephrolithiasis [N20.0]     * Ureteropelvic junction (UPJ) obstruction, left [N13.5]    Procedure(s) (LRB):  CYSTOSCOPY WITH STENT REMOVAL (Bilateral)  URETEROSCOPY (Right)  LITHOTRIPSY-LASER (Right)  PYELOGRAM-RETROGRADE (Bilateral)  EXTRACTION - STONE (Right)  REPLACEMENT-STENT (Right)    Anesthesia: General    Description of the findings of the procedure: see op note    Findings/Key Components: see op note    Estimated Blood Loss: * No values recorded between 2/23/2018  7:28 AM and 2/23/2018  8:17 AM *         Specimens:   Specimen (12h ago through future)    None          Discharge Note    SUMMARY     Admit Date: 2/23/2018    Discharge Date and Time:  02/23/2018 8:19 AM    Hospital Course (synopsis of major diagnoses, care, treatment, and services provided during the course of the hospital stay): Patient admitted for above procedure. Tolerated the procedure well. Discharged home in stable condition.     Final Diagnosis: Post-Op Diagnosis Codes:     * Nephrolithiasis [N20.0]     * Ureteropelvic junction (UPJ) obstruction, left [N13.5]    Disposition: Home or Self Care    Follow Up/Patient Instructions:     Medications:  Reconciled Home Medications:   Current Discharge Medication List      START taking these medications    Details   !! oxyCODONE-acetaminophen (PERCOCET) 5-325 mg per tablet Take 1 tablet by mouth every 4 (four) hours as needed.  Qty: 20 tablet, Refills: 0      polyethylene glycol (GLYCOLAX) 17 gram PwPk Take 17 g by mouth once daily.  Qty: 30 packet, Refills: 0      !! tamsulosin (FLOMAX) 0.4 mg Cp24 Take 1 capsule (0.4 mg total) by  mouth once daily.  Qty: 10 capsule, Refills: 0       !! - Potential duplicate medications found. Please discuss with provider.      CONTINUE these medications which have NOT CHANGED    Details   hyoscyamine (LEVSIN/SL) 0.125 mg Subl Place 1 tablet (0.125 mg total) under the tongue every 4 (four) hours as needed (bladder spasms).  Qty: 20 tablet, Refills: 1      !! tamsulosin (FLOMAX) 0.4 mg Cp24 Take 1 capsule (0.4 mg total) by mouth once daily.  Qty: 30 capsule, Refills: 0      !! oxyCODONE-acetaminophen (PERCOCET) 5-325 mg per tablet Take 1 tablet by mouth every 6 (six) hours as needed.  Qty: 15 tablet, Refills: 0       !! - Potential duplicate medications found. Please discuss with provider.          Discharge Procedure Orders  Diet Adult Regular     Activity as tolerated     Notify your health care provider if you experience any of the following:  temperature >100.4     Notify your health care provider if you experience any of the following:  persistent nausea and vomiting or diarrhea     Notify your health care provider if you experience any of the following:  severe uncontrolled pain     Notify your health care provider if you experience any of the following:  redness, tenderness, or signs of infection (pain, swelling, redness, odor or green/yellow discharge around incision site)     Notify your health care provider if you experience any of the following:  difficulty breathing or increased cough     Notify your health care provider if you experience any of the following:  worsening rash     Notify your health care provider if you experience any of the following:  severe persistent headache     Notify your health care provider if you experience any of the following:  persistent dizziness, light-headedness, or visual disturbances     Notify your health care provider if you experience any of the following:  increased confusion or weakness       Follow-up Information     Stan Reynoso MD In 3 weeks.     Specialty:  Urology  Contact information:  [address]  [facility] [address]  [phone]

## 2018-02-23 NOTE — ADDENDUM NOTE
Addendum  created 02/23/18 0944 by Nicole A. Lombardi, CRNA    Anesthesia Intra Flowsheets edited

## 2018-02-23 NOTE — ANESTHESIA PREPROCEDURE EVALUATION
02/23/2018  Sabina Clements is a 27 y.o., female.    Anesthesia Evaluation    I have reviewed the Patient Summary Reports.    I have reviewed the Nursing Notes.   I have reviewed the Medications.     Review of Systems  Anesthesia Hx:  No previous Anesthesia  Neg history of prior surgery. Denies Family Hx of Anesthesia complications.   Denies Personal Hx of Anesthesia complications.   Social:  Non-Smoker, No Alcohol Use    Hematology/Oncology:  Hematology Normal   Oncology Normal     EENT/Dental:EENT/Dental Normal   Cardiovascular:  Cardiovascular Normal Exercise tolerance: good     Pulmonary:  Pulmonary Normal    Renal/:   Chronic Renal Disease, CRI    Hepatic/GI:  Hepatic/GI Normal    Musculoskeletal:  Musculoskeletal Normal    Neurological:  Neurology Normal    Endocrine:  Endocrine Normal    Dermatological:  Skin Normal    Psych:  Psychiatric Normal           Physical Exam  General:  Well nourished    Airway/Jaw/Neck:  Airway Findings: Mouth Opening: Normal Tongue: Normal  General Airway Assessment: Pediatric  TM Distance: Normal, at least 6 cm  Jaw/Neck Findings:  Micrognathia: Negative Neck ROM: Normal ROM      Dental:  Dental Findings: In tact   Chest/Lungs:  Chest/Lungs Findings: Clear to auscultation, Normal Respiratory Rate     Heart/Vascular:  Heart Findings: Rate: Normal  Rhythm: Regular Rhythm  Sounds: Normal  Heart murmur: negative    Abdomen:  Abdomen Findings:  Normal, Nontender, Soft       Mental Status:  Mental Status Findings:  Cooperative, Alert and Oriented         Anesthesia Plan  Type of Anesthesia, risks & benefits discussed:  Anesthesia Type:    Patient's Preference:   Intra-op Monitoring Plan: standard ASA monitors  Intra-op Monitoring Plan Comments:   Post Op Pain Control Plan: multimodal analgesia  Post Op Pain Control Plan Comments:   Induction:   IV  Beta Blocker:  Patient is not  currently on a Beta-Blocker (No further documentation required).       Informed Consent: Patient understands risks and agrees with Anesthesia plan.  Questions answered. Anesthesia consent signed with patient.  ASA Score: 2     Day of Surgery Review of History & Physical:    H&P update referred to the surgeon.         Ready For Surgery From Anesthesia Perspective.

## 2018-02-23 NOTE — ANESTHESIA RELEASE NOTE
"Anesthesia Release from PACU Note    Patient: Sabina Clements    Procedure(s) Performed: Procedure(s) (LRB):  CYSTOSCOPY WITH STENT REMOVAL (Bilateral)  URETEROSCOPY (Right)  LITHOTRIPSY-LASER (Right)  PYELOGRAM-RETROGRADE (Bilateral)  EXTRACTION - STONE (Right)  REPLACEMENT-STENT (Right)    Anesthesia type: GEN    Post pain: Adequate analgesia reported    Post assessment: no apparent anesthetic complications, tolerated procedure well and no evidence of recall    Post vital signs: /74 (BP Location: Right arm, Patient Position: Sitting)   Pulse (!) 55   Temp 36.5 °C (97.7 °F) (Temporal)   Resp 12   Ht 5' 5" (1.651 m)   Wt 56.7 kg (125 lb)   LMP 02/09/2018 (Approximate)   SpO2 100%   Breastfeeding? No   BMI 20.80 kg/m²     Level of consciousness: awake, alert and oriented    Nausea/Vomiting: no nausea/no vomiting    Complications: none    Airway Patency: patent    Respiratory: unassisted, spontaneous ventilation, room air    Cardiovascular: stable and blood pressure at baseline    Hydration: euvolemic    "

## 2018-02-23 NOTE — ANESTHESIA POSTPROCEDURE EVALUATION
"Anesthesia Post Evaluation    Patient: Sabina Clements    Procedure(s) Performed: Procedure(s) (LRB):  CYSTOSCOPY WITH STENT REMOVAL (Bilateral)  URETEROSCOPY (Right)  LITHOTRIPSY-LASER (Right)  PYELOGRAM-RETROGRADE (Bilateral)  EXTRACTION - STONE (Right)  REPLACEMENT-STENT (Right)    Final Anesthesia Type: general  Patient location during evaluation: PACU  Patient participation: Yes- Able to Participate  Level of consciousness: awake and alert and oriented  Post-procedure vital signs: reviewed and stable  Pain management: adequate  Airway patency: patent  PONV status at discharge: No PONV  Anesthetic complications: no      Cardiovascular status: stable  Respiratory status: unassisted, spontaneous ventilation and room air  Hydration status: euvolemic  Follow-up not needed.        Visit Vitals  /74 (BP Location: Right arm, Patient Position: Sitting)   Pulse (!) 55   Temp 36.5 °C (97.7 °F) (Temporal)   Resp 12   Ht 5' 5" (1.651 m)   Wt 56.7 kg (125 lb)   LMP 02/09/2018 (Approximate)   SpO2 100%   Breastfeeding? No   BMI 20.80 kg/m²       Pain/Sandra Score: Pain Assessment Performed: Yes (2/23/2018  8:19 AM)  Presence of Pain: non-verbal indicators absent (2/23/2018  8:19 AM)  Sandra Score: 7 (2/23/2018  8:19 AM)      "

## 2018-02-23 NOTE — DISCHARGE SUMMARY
[facility]  Discharge Note  Short Stay    Admit Date: 2/23/2018    Discharge Date and Time: 2/23/18     Attending Physician: Stan Reynoso MD     Discharge Provider: Iban Rodriguez    Diagnoses:  Active Hospital Problems    Diagnosis  POA    Nephrolithiasis [N20.0]  Yes      Resolved Hospital Problems    Diagnosis Date Resolved POA   No resolved problems to display.       Discharged Condition: good    Hospital Course: Patient was admitted for an outpatient procedure and tolerated the procedure well with no complications.    Final Diagnoses: Same as principal problem.    Disposition: Home or Self Care    Follow up/Patient Instructions:    Medications:  Reconciled Home Medications:   Current Discharge Medication List      START taking these medications    Details   !! oxyCODONE-acetaminophen (PERCOCET) 5-325 mg per tablet Take 1 tablet by mouth every 4 (four) hours as needed.  Qty: 20 tablet, Refills: 0      polyethylene glycol (GLYCOLAX) 17 gram PwPk Take 17 g by mouth once daily.  Qty: 30 packet, Refills: 0      !! tamsulosin (FLOMAX) 0.4 mg Cp24 Take 1 capsule (0.4 mg total) by mouth once daily.  Qty: 10 capsule, Refills: 0       !! - Potential duplicate medications found. Please discuss with provider.      CONTINUE these medications which have NOT CHANGED    Details   hyoscyamine (LEVSIN/SL) 0.125 mg Subl Place 1 tablet (0.125 mg total) under the tongue every 4 (four) hours as needed (bladder spasms).  Qty: 20 tablet, Refills: 1      !! tamsulosin (FLOMAX) 0.4 mg Cp24 Take 1 capsule (0.4 mg total) by mouth once daily.  Qty: 30 capsule, Refills: 0      !! oxyCODONE-acetaminophen (PERCOCET) 5-325 mg per tablet Take 1 tablet by mouth every 6 (six) hours as needed.  Qty: 15 tablet, Refills: 0       !! - Potential duplicate medications found. Please discuss with provider.          Discharge Procedure Orders  Diet Adult Regular     Activity as tolerated     Notify your health care provider if you  experience any of the following:  temperature >100.4     Notify your health care provider if you experience any of the following:  persistent nausea and vomiting or diarrhea     Notify your health care provider if you experience any of the following:  severe uncontrolled pain     Notify your health care provider if you experience any of the following:  redness, tenderness, or signs of infection (pain, swelling, redness, odor or green/yellow discharge around incision site)     Notify your health care provider if you experience any of the following:  difficulty breathing or increased cough     Notify your health care provider if you experience any of the following:  worsening rash     Notify your health care provider if you experience any of the following:  severe persistent headache     Notify your health care provider if you experience any of the following:  persistent dizziness, light-headedness, or visual disturbances     Notify your health care provider if you experience any of the following:  increased confusion or weakness       Follow-up Information     Stan Reynoso MD In 3 weeks.    Specialty:  Urology  Contact information:  [facility] [address]  [phone]                   Discharge Procedure Orders (must include Diet, Follow-up, Activity):    Discharge Procedure Orders (must include Diet, Follow-up, Activity)  Diet Adult Regular     Activity as tolerated     Notify your health care provider if you experience any of the following:  temperature >100.4     Notify your health care provider if you experience any of the following:  persistent nausea and vomiting or diarrhea     Notify your health care provider if you experience any of the following:  severe uncontrolled pain     Notify your health care provider if you experience any of the following:  redness, tenderness, or signs of infection (pain, swelling, redness, odor or green/yellow discharge around incision site)     Notify your  health care provider if you experience any of the following:  difficulty breathing or increased cough     Notify your health care provider if you experience any of the following:  worsening rash     Notify your health care provider if you experience any of the following:  severe persistent headache     Notify your health care provider if you experience any of the following:  persistent dizziness, light-headedness, or visual disturbances     Notify your health care provider if you experience any of the following:  increased confusion or weakness

## 2018-02-23 NOTE — PLAN OF CARE
Discharge instructions reviewed with pt and partner, handouts and prescriptions given,  verbalized understanding with no further questions at this time. Pt will come back for second procedure on March 9, 2018 per AVS sheet with MD telephone number provided. VSS on RA, denies pain and nausea, tolerating po fluids without difficulty, urinated 300 ml of clear red urine, stent with strings in place. Fall precautions reviewed, consents in chart, PIV removed.

## 2018-02-23 NOTE — TRANSFER OF CARE
"Anesthesia Transfer of Care Note    Patient: Sabina Clements    Procedure(s) Performed: Procedure(s) (LRB):  CYSTOSCOPY WITH STENT REMOVAL (Bilateral)  URETEROSCOPY (Right)  LITHOTRIPSY-LASER (Right)  PYELOGRAM-RETROGRADE (Bilateral)  EXTRACTION - STONE (Right)  REPLACEMENT-STENT (Right)    Patient location: PACU    Anesthesia Type: general    Transport from OR: Transported from OR on room air with adequate spontaneous ventilation    Post pain: adequate analgesia    Post assessment: no apparent anesthetic complications and tolerated procedure well    Post vital signs: stable    Level of consciousness: responds to stimulation    Nausea/Vomiting: no nausea/vomiting    Complications: none    Transfer of care protocol was followed      Last vitals:   Visit Vitals  BP (!) 119/48 (BP Location: Right arm, Patient Position: Lying)   Pulse 71   Temp 36.5 °C (97.7 °F) (Temporal)   Resp 16   Ht 5' 5" (1.651 m)   Wt 56.7 kg (125 lb)   LMP 02/09/2018 (Approximate)   SpO2 100%   Breastfeeding? No   BMI 20.80 kg/m²     "

## 2018-02-26 ENCOUNTER — TELEPHONE (OUTPATIENT)
Dept: UROLOGY | Facility: CLINIC | Age: 28
End: 2018-02-26

## 2018-02-26 ENCOUNTER — ANESTHESIA EVENT (OUTPATIENT)
Dept: SURGERY | Facility: HOSPITAL | Age: 28
End: 2018-02-26
Payer: COMMERCIAL

## 2018-02-26 DIAGNOSIS — Z01.818 PREOPERATIVE TESTING: Primary | ICD-10-CM

## 2018-02-26 RX ORDER — ACETAMINOPHEN 325 MG/1
325 TABLET ORAL EVERY 6 HOURS PRN
Status: ON HOLD | COMMUNITY
End: 2018-03-09

## 2018-02-26 NOTE — PRE ADMISSION SCREENING
Anesthesia Assessment: Preoperative EQUATION    Planned Procedure: Procedure(s) (LRB):  ROBOTIC ASSISTED LAPAROSCOPIC PYELOPLASTY (Left)  Placement-Stent (Left)  Requested Anesthesia Type:General  Surgeon: Stan Reynoso MD  Service: Urology  Known or anticipated Date of Surgery:3/9/2018    Surgeon notes: reviewed    Electronic QUestionnaire Assessment completed via nurse interview with patient.        No AQ    Triage considerations:     The patient has no apparent active cardiac condition (No unstable coronary Syndrome such as severe unstable angina or recent [<1 month] myocardial infarction, decompensated CHF, severe valvular   disease or significant arrhythmia)    Previous anesthesia records:GETA, MAC and No problemsCysto/lithotripsy : 02/23/18; Placement Time: 0719; Method of Intubation: Direct laryngoscopy; Inserted by: CRNA; Airway Device: Endotracheal Tube; Mask Ventilation: Easy; Intubated: Postinduction; Blade: Monika #3; Airway Device Size: 7.0; Style: Cuffed; Cuff Inflation: Minimal occlusive pressure; Inflation Amount: 7; Placement Verified By: Auscultation, Capnometry, ETT Condensation; Grade: Grade I; Complicating Factors: None; Intubation Findings: Positive EtCO2, Bilateral breath sounds, Atraumatic/Condition of teeth unchanged;  Depth of Insertion: 21; Securment: Lips; Complications: None; Breath Sounds: Equal Bilateral; Insertion Attempts: 1;   Airway/Jaw/Neck:  Airway Findings: Mouth Opening: Normal Tongue: Normal  General Airway Assessment: Pediatric  TM Distance: Normal, at least 6 cm  Jaw/Neck Findings:  Micrognathia: Negative Neck ROM: Normal ROM      Dental:  Dental Findings: In tact     Last PCP note: no PCP  Subspecialty notes: Urology    Other important co-morbidities: ureteropelvic junction obstruction     Tests already available:  Available tests,  within 3 months . 1/19/18 CBC, CMP.             Instructions given. (See in Nurse's note)    Optimization:  Anesthesia Preop Clinic  Assessment  Indicated-not required D/T recent anesthesia note in EPIC       Plan:    Testing:  T&S      Patient  has previously scheduled Medical Appointment:none    Navigation: Tests Scheduled. Am of surgery                         Straight Line to surgery.

## 2018-02-26 NOTE — ANESTHESIA PREPROCEDURE EVALUATION
Pre Admission Screening  Jenny Pineda RN      []Hide copied text  Anesthesia Assessment: Preoperative EQUATION     Planned Procedure: Procedure(s) (LRB):  ROBOTIC ASSISTED LAPAROSCOPIC PYELOPLASTY (Left)  Placement-Stent (Left)  Requested Anesthesia Type:General  Surgeon: Stan Reynoso MD  Service: Urology  Known or anticipated Date of Surgery:3/9/2018     Surgeon notes: reviewed     Electronic QUestionnaire Assessment completed via nurse interview with patient.         No AQ     Triage considerations:      The patient has no apparent active cardiac condition (No unstable coronary Syndrome such as severe unstable angina or recent [<1 month] myocardial infarction, decompensated CHF, severe valvular   disease or significant arrhythmia)     Previous anesthesia records:GETA, MAC and No problemsCysto/lithotripsy : 02/23/18; Placement Time: 0719; Method of Intubation: Direct laryngoscopy; Inserted by: CRNA; Airway Device: Endotracheal Tube; Mask Ventilation: Easy; Intubated: Postinduction; Blade: Monika #3; Airway Device Size: 7.0; Style: Cuffed; Cuff Inflation: Minimal occlusive pressure; Inflation Amount: 7; Placement Verified By: Auscultation, Capnometry, ETT Condensation; Grade: Grade I; Complicating Factors: None; Intubation Findings: Positive EtCO2, Bilateral breath sounds, Atraumatic/Condition of teeth unchanged;  Depth of Insertion: 21; Securment: Lips; Complications: None; Breath Sounds: Equal Bilateral; Insertion Attempts: 1;   Airway/Jaw/Neck:  Airway Findings: Mouth Opening: Normal Tongue: Normal  General Airway Assessment: Pediatric  TM Distance: Normal, at least 6 cm  Jaw/Neck Findings:  Micrognathia: Negative Neck ROM: Normal ROM      Dental:  Dental Findings: In tact      Last PCP note: no PCP  Subspecialty notes: Urology     Other important co-morbidities: ureteropelvic junction obstruction     Tests already available:  Available tests,  within 3 months . 1/19/18 CBC, CMP.                              Instructions given. (See in Nurse's note)     Optimization:  Anesthesia Preop Clinic Assessment  Indicated-not required D/T recent anesthesia note in EPIC                Plan:    Testing:  T&S                           Patient  has previously scheduled Medical Appointment:none     Navigation: Tests Scheduled. Am of surgery                                              Straight Line to surgery.                             Electronically signed by Jenny Pineda RN at 2/26/2018 11:02 AM        Pre-admit on 3/9/2018            Detailed Report                                                                                                                     02/26/2018  Sabina Clements is a 27 y.o., female.    Anesthesia Evaluation    I have reviewed the Patient Summary Reports.        Review of Systems  Anesthesia Hx:  No problems with previous Anesthesia  History of prior surgery of interest to airway management or planning: Previous anesthesia: General 2/23/18 cysto/lithotripsy with general anesthesia.  Procedure performed at an [facility]. Denies Family Hx of Anesthesia complications.   Denies Personal Hx of Anesthesia complications.   Social:  Non-Smoker  Illicit Drug Use: Types of drugs include Marijuana,  drug use is current.  Hematology/Oncology:  Hematology Normal   Oncology Normal     EENT/Dental:EENT/Dental Normal   Cardiovascular:  Cardiovascular Normal Exercise tolerance: good   Denies Angina.    Pulmonary:  Pulmonary Normal  Denies Shortness of breath.  Denies Recent URI.    Renal/:  Renal/ Normal renal calculi   Other Renal / Gu Conditions: (ureteropelvic junction obstruction.) Hydronephrosis  Hepatic/GI:  Hepatic/GI Normal    Musculoskeletal:  Musculoskeletal Normal    Neurological:  Neurology Normal    Endocrine:  Endocrine Normal    Dermatological:  Skin Normal    Psych:  Psychiatric Normal           Physical Exam  General:  Well nourished    Airway/Jaw/Neck:  Airway Findings: Mouth  Opening: Normal Tongue: Normal  General Airway Assessment: Adult  Mallampati: II  Improves to II with phonation.  TM Distance: Normal, at least 6 cm  Jaw/Neck Findings:  Neck ROM: Normal ROM      Dental:  Dental Findings: In tact   Chest/Lungs:  Chest/Lungs Findings: Clear to auscultation, Normal Respiratory Rate     Heart/Vascular:  Heart Findings: Rate: Normal  Rhythm: Regular Rhythm  Sounds: Normal             Anesthesia Plan  Type of Anesthesia, risks & benefits discussed:  Anesthesia Type:  general  Patient's Preference: General  Intra-op Monitoring Plan:   Intra-op Monitoring Plan Comments:   Post Op Pain Control Plan:   Post Op Pain Control Plan Comments:   Induction:   IV  Beta Blocker:  Patient is not currently on a Beta-Blocker (No further documentation required).       Informed Consent: Patient understands risks and agrees with Anesthesia plan.  Questions answered. Anesthesia consent signed with patient.  ASA Score: 2     Day of Surgery Review of History & Physical: I have interviewed and examined the patient. I have reviewed the patient's H&P dated:  There are no significant changes.          Ready For Surgery From Anesthesia Perspective.

## 2018-02-26 NOTE — TELEPHONE ENCOUNTER
----- Message from Shira Felix MA sent at 2/26/2018 10:58 AM CST -----  Pt has questions about removing her stent.

## 2018-03-08 ENCOUNTER — TELEPHONE (OUTPATIENT)
Dept: UROLOGY | Facility: CLINIC | Age: 28
End: 2018-03-08

## 2018-03-08 NOTE — TELEPHONE ENCOUNTER
Called pt to confirm arrival time of 5am for procedure on 3/9/2018. Gave pt NPO instructions and gave pt opportunity to ask questions. Pt verbalized understanding.

## 2018-03-09 ENCOUNTER — ANESTHESIA (OUTPATIENT)
Dept: SURGERY | Facility: HOSPITAL | Age: 28
End: 2018-03-09
Payer: COMMERCIAL

## 2018-03-09 ENCOUNTER — SURGERY (OUTPATIENT)
Age: 28
End: 2018-03-09

## 2018-03-09 ENCOUNTER — HOSPITAL ENCOUNTER (OUTPATIENT)
Facility: HOSPITAL | Age: 28
Discharge: HOME | End: 2018-03-10
Attending: UROLOGY | Admitting: UROLOGY
Payer: COMMERCIAL

## 2018-03-09 DIAGNOSIS — N13.5 UPJ OBSTRUCTION, ACQUIRED: Primary | ICD-10-CM

## 2018-03-09 DIAGNOSIS — Z01.818 PREOPERATIVE TESTING: ICD-10-CM

## 2018-03-09 DIAGNOSIS — Q62.39 UPJ OBSTRUCTION, CONGENITAL: ICD-10-CM

## 2018-03-09 DIAGNOSIS — N13.5 URETEROPELVIC JUNCTION (UPJ) OBSTRUCTION, LEFT: Primary | ICD-10-CM

## 2018-03-09 LAB
ABO + RH BLD: NORMAL
B-HCG UR QL: NEGATIVE
BLD GP AB SCN CELLS X3 SERPL QL: NORMAL
CTP QC/QA: YES

## 2018-03-09 PROCEDURE — 27201423 OPTIME MED/SURG SUP & DEVICES STERILE SUPPLY: Performed by: UROLOGY

## 2018-03-09 PROCEDURE — 25000003 PHARM REV CODE 250: Performed by: UROLOGY

## 2018-03-09 PROCEDURE — 37000008 HC ANESTHESIA 1ST 15 MINUTES: Performed by: UROLOGY

## 2018-03-09 PROCEDURE — 63600175 PHARM REV CODE 636 W HCPCS: Performed by: UROLOGY

## 2018-03-09 PROCEDURE — D9220A PRA ANESTHESIA: Mod: CRNA,,, | Performed by: NURSE ANESTHETIST, CERTIFIED REGISTERED

## 2018-03-09 PROCEDURE — 37000009 HC ANESTHESIA EA ADD 15 MINS: Performed by: UROLOGY

## 2018-03-09 PROCEDURE — 71000033 HC RECOVERY, INTIAL HOUR: Performed by: UROLOGY

## 2018-03-09 PROCEDURE — C2617 STENT, NON-COR, TEM W/O DEL: HCPCS | Performed by: UROLOGY

## 2018-03-09 PROCEDURE — 50544 LAPAROSCOPY PYELOPLASTY: CPT | Mod: AS,LT,, | Performed by: PHYSICIAN ASSISTANT

## 2018-03-09 PROCEDURE — 81025 URINE PREGNANCY TEST: CPT | Performed by: UROLOGY

## 2018-03-09 PROCEDURE — 25000003 PHARM REV CODE 250: Performed by: NURSE ANESTHETIST, CERTIFIED REGISTERED

## 2018-03-09 PROCEDURE — C1758 CATHETER, URETERAL: HCPCS | Performed by: UROLOGY

## 2018-03-09 PROCEDURE — 71000039 HC RECOVERY, EACH ADD'L HOUR: Performed by: UROLOGY

## 2018-03-09 PROCEDURE — 63600175 PHARM REV CODE 636 W HCPCS: Performed by: ANESTHESIOLOGY

## 2018-03-09 PROCEDURE — S0028 INJECTION, FAMOTIDINE, 20 MG: HCPCS | Performed by: NURSE ANESTHETIST, CERTIFIED REGISTERED

## 2018-03-09 PROCEDURE — D9220A PRA ANESTHESIA: Mod: ANES,,, | Performed by: ANESTHESIOLOGY

## 2018-03-09 PROCEDURE — 36000712 HC OR TIME LEV V 1ST 15 MIN: Performed by: UROLOGY

## 2018-03-09 PROCEDURE — 86850 RBC ANTIBODY SCREEN: CPT

## 2018-03-09 PROCEDURE — 63600175 PHARM REV CODE 636 W HCPCS: Performed by: NURSE ANESTHETIST, CERTIFIED REGISTERED

## 2018-03-09 PROCEDURE — 50544 LAPAROSCOPY PYELOPLASTY: CPT | Mod: LT,,, | Performed by: UROLOGY

## 2018-03-09 PROCEDURE — C1769 GUIDE WIRE: HCPCS | Performed by: UROLOGY

## 2018-03-09 PROCEDURE — 36000713 HC OR TIME LEV V EA ADD 15 MIN: Performed by: UROLOGY

## 2018-03-09 PROCEDURE — 27100025 HC TUBING, SET FLUID WARMER: Performed by: NURSE ANESTHETIST, CERTIFIED REGISTERED

## 2018-03-09 DEVICE — STENT URETERAL UNIV 6FR 24CM: Type: IMPLANTABLE DEVICE | Site: URETER | Status: FUNCTIONAL

## 2018-03-09 RX ORDER — DIPHENHYDRAMINE HYDROCHLORIDE 50 MG/ML
12.5 INJECTION INTRAMUSCULAR; INTRAVENOUS EVERY 6 HOURS PRN
Status: DISCONTINUED | OUTPATIENT
Start: 2018-03-09 | End: 2018-03-10 | Stop reason: HOSPADM

## 2018-03-09 RX ORDER — DEXMEDETOMIDINE HYDROCHLORIDE 100 UG/ML
INJECTION, SOLUTION INTRAVENOUS
Status: DISCONTINUED | OUTPATIENT
Start: 2018-03-09 | End: 2018-03-09

## 2018-03-09 RX ORDER — SODIUM CHLORIDE 0.9 % (FLUSH) 0.9 %
3 SYRINGE (ML) INJECTION
Status: DISCONTINUED | OUTPATIENT
Start: 2018-03-09 | End: 2018-03-09 | Stop reason: HOSPADM

## 2018-03-09 RX ORDER — DEXAMETHASONE SODIUM PHOSPHATE 4 MG/ML
INJECTION, SOLUTION INTRA-ARTICULAR; INTRALESIONAL; INTRAMUSCULAR; INTRAVENOUS; SOFT TISSUE
Status: DISCONTINUED | OUTPATIENT
Start: 2018-03-09 | End: 2018-03-09

## 2018-03-09 RX ORDER — FENTANYL CITRATE 50 UG/ML
25 INJECTION, SOLUTION INTRAMUSCULAR; INTRAVENOUS EVERY 5 MIN PRN
Status: CANCELLED | OUTPATIENT
Start: 2018-03-09

## 2018-03-09 RX ORDER — SODIUM CHLORIDE 9 MG/ML
INJECTION, SOLUTION INTRAVENOUS CONTINUOUS
Status: DISCONTINUED | OUTPATIENT
Start: 2018-03-09 | End: 2018-03-10 | Stop reason: HOSPADM

## 2018-03-09 RX ORDER — ACETAMINOPHEN 10 MG/ML
1000 INJECTION, SOLUTION INTRAVENOUS EVERY 8 HOURS
Status: COMPLETED | OUTPATIENT
Start: 2018-03-09 | End: 2018-03-09

## 2018-03-09 RX ORDER — GLYCOPYRROLATE 0.2 MG/ML
INJECTION INTRAMUSCULAR; INTRAVENOUS
Status: DISCONTINUED | OUTPATIENT
Start: 2018-03-09 | End: 2018-03-09

## 2018-03-09 RX ORDER — SODIUM CHLORIDE 9 MG/ML
INJECTION, SOLUTION INTRAVENOUS CONTINUOUS
Status: DISCONTINUED | OUTPATIENT
Start: 2018-03-09 | End: 2018-03-09

## 2018-03-09 RX ORDER — POLYETHYLENE GLYCOL 3350 17 G/17G
17 POWDER, FOR SOLUTION ORAL DAILY
Status: DISCONTINUED | OUTPATIENT
Start: 2018-03-09 | End: 2018-03-10 | Stop reason: HOSPADM

## 2018-03-09 RX ORDER — PANTOPRAZOLE SODIUM 40 MG/1
40 TABLET, DELAYED RELEASE ORAL DAILY
Status: DISCONTINUED | OUTPATIENT
Start: 2018-03-09 | End: 2018-03-10 | Stop reason: HOSPADM

## 2018-03-09 RX ORDER — SODIUM CHLORIDE 0.9 % (FLUSH) 0.9 %
3 SYRINGE (ML) INJECTION
Status: DISCONTINUED | OUTPATIENT
Start: 2018-03-09 | End: 2018-03-09

## 2018-03-09 RX ORDER — CEFAZOLIN SODIUM 1 G/3ML
2 INJECTION, POWDER, FOR SOLUTION INTRAMUSCULAR; INTRAVENOUS
Status: DISCONTINUED | OUTPATIENT
Start: 2018-03-09 | End: 2018-03-09

## 2018-03-09 RX ORDER — PROPOFOL 10 MG/ML
VIAL (ML) INTRAVENOUS
Status: DISCONTINUED | OUTPATIENT
Start: 2018-03-09 | End: 2018-03-09

## 2018-03-09 RX ORDER — FENTANYL CITRATE 50 UG/ML
25 INJECTION, SOLUTION INTRAMUSCULAR; INTRAVENOUS EVERY 5 MIN PRN
Status: DISCONTINUED | OUTPATIENT
Start: 2018-03-09 | End: 2018-03-09 | Stop reason: HOSPADM

## 2018-03-09 RX ORDER — MIDAZOLAM HYDROCHLORIDE 1 MG/ML
INJECTION, SOLUTION INTRAMUSCULAR; INTRAVENOUS
Status: DISCONTINUED | OUTPATIENT
Start: 2018-03-09 | End: 2018-03-09

## 2018-03-09 RX ORDER — KETOROLAC TROMETHAMINE 30 MG/ML
15 INJECTION, SOLUTION INTRAMUSCULAR; INTRAVENOUS EVERY 8 HOURS
Status: DISCONTINUED | OUTPATIENT
Start: 2018-03-09 | End: 2018-03-10 | Stop reason: HOSPADM

## 2018-03-09 RX ORDER — HEPARIN SODIUM 5000 [USP'U]/ML
5000 INJECTION, SOLUTION INTRAVENOUS; SUBCUTANEOUS EVERY 8 HOURS
Status: DISCONTINUED | OUTPATIENT
Start: 2018-03-09 | End: 2018-03-09

## 2018-03-09 RX ORDER — TAMSULOSIN HYDROCHLORIDE 0.4 MG/1
0.4 CAPSULE ORAL DAILY
Status: DISCONTINUED | OUTPATIENT
Start: 2018-03-09 | End: 2018-03-10 | Stop reason: HOSPADM

## 2018-03-09 RX ORDER — HYOSCYAMINE SULFATE 0.12 MG/1
0.12 TABLET SUBLINGUAL EVERY 4 HOURS PRN
Status: DISCONTINUED | OUTPATIENT
Start: 2018-03-09 | End: 2018-03-10 | Stop reason: HOSPADM

## 2018-03-09 RX ORDER — FAMOTIDINE 10 MG/ML
INJECTION INTRAVENOUS
Status: DISCONTINUED | OUTPATIENT
Start: 2018-03-09 | End: 2018-03-09

## 2018-03-09 RX ORDER — ACETAMINOPHEN 10 MG/ML
1000 INJECTION, SOLUTION INTRAVENOUS EVERY 6 HOURS
Status: COMPLETED | OUTPATIENT
Start: 2018-03-09 | End: 2018-03-10

## 2018-03-09 RX ORDER — BISACODYL 10 MG
10 SUPPOSITORY, RECTAL RECTAL DAILY
Status: DISCONTINUED | OUTPATIENT
Start: 2018-03-09 | End: 2018-03-10 | Stop reason: HOSPADM

## 2018-03-09 RX ORDER — OXYCODONE HYDROCHLORIDE 5 MG/1
10 TABLET ORAL EVERY 4 HOURS PRN
Status: DISCONTINUED | OUTPATIENT
Start: 2018-03-09 | End: 2018-03-10 | Stop reason: HOSPADM

## 2018-03-09 RX ORDER — LIDOCAINE HCL/PF 100 MG/5ML
SYRINGE (ML) INTRAVENOUS
Status: DISCONTINUED | OUTPATIENT
Start: 2018-03-09 | End: 2018-03-09

## 2018-03-09 RX ORDER — OXYCODONE HYDROCHLORIDE 5 MG/1
5 TABLET ORAL EVERY 4 HOURS PRN
Status: DISCONTINUED | OUTPATIENT
Start: 2018-03-09 | End: 2018-03-10 | Stop reason: HOSPADM

## 2018-03-09 RX ORDER — FENTANYL CITRATE 50 UG/ML
INJECTION, SOLUTION INTRAMUSCULAR; INTRAVENOUS
Status: DISCONTINUED | OUTPATIENT
Start: 2018-03-09 | End: 2018-03-09

## 2018-03-09 RX ORDER — ONDANSETRON 2 MG/ML
INJECTION INTRAMUSCULAR; INTRAVENOUS
Status: DISCONTINUED | OUTPATIENT
Start: 2018-03-09 | End: 2018-03-09

## 2018-03-09 RX ORDER — CEFAZOLIN SODIUM 1 G/3ML
1 INJECTION, POWDER, FOR SOLUTION INTRAMUSCULAR; INTRAVENOUS
Status: COMPLETED | OUTPATIENT
Start: 2018-03-09 | End: 2018-03-10

## 2018-03-09 RX ORDER — ONDANSETRON 2 MG/ML
4 INJECTION INTRAMUSCULAR; INTRAVENOUS EVERY 8 HOURS PRN
Status: DISCONTINUED | OUTPATIENT
Start: 2018-03-09 | End: 2018-03-10 | Stop reason: HOSPADM

## 2018-03-09 RX ORDER — METOCLOPRAMIDE HYDROCHLORIDE 5 MG/ML
INJECTION INTRAMUSCULAR; INTRAVENOUS
Status: DISCONTINUED | OUTPATIENT
Start: 2018-03-09 | End: 2018-03-09

## 2018-03-09 RX ORDER — ROCURONIUM BROMIDE 10 MG/ML
INJECTION, SOLUTION INTRAVENOUS
Status: DISCONTINUED | OUTPATIENT
Start: 2018-03-09 | End: 2018-03-09

## 2018-03-09 RX ADMIN — FENTANYL CITRATE 50 MCG: 50 INJECTION, SOLUTION INTRAMUSCULAR; INTRAVENOUS at 09:03

## 2018-03-09 RX ADMIN — POLYETHYLENE GLYCOL 3350 17 G: 17 POWDER, FOR SOLUTION ORAL at 12:03

## 2018-03-09 RX ADMIN — ROCURONIUM BROMIDE 40 MG: 10 INJECTION, SOLUTION INTRAVENOUS at 07:03

## 2018-03-09 RX ADMIN — ACETAMINOPHEN 1000 MG: 10 INJECTION, SOLUTION INTRAVENOUS at 06:03

## 2018-03-09 RX ADMIN — TAMSULOSIN HYDROCHLORIDE 0.4 MG: 0.4 CAPSULE ORAL at 12:03

## 2018-03-09 RX ADMIN — SODIUM CHLORIDE: 0.9 INJECTION, SOLUTION INTRAVENOUS at 06:03

## 2018-03-09 RX ADMIN — DEXAMETHASONE SODIUM PHOSPHATE 4 MG: 4 INJECTION, SOLUTION INTRAMUSCULAR; INTRAVENOUS at 07:03

## 2018-03-09 RX ADMIN — FENTANYL CITRATE 100 MCG: 50 INJECTION, SOLUTION INTRAMUSCULAR; INTRAVENOUS at 07:03

## 2018-03-09 RX ADMIN — FAMOTIDINE 20 MG: 10 INJECTION, SOLUTION INTRAVENOUS at 07:03

## 2018-03-09 RX ADMIN — LIDOCAINE HYDROCHLORIDE 60 MG: 20 INJECTION, SOLUTION INTRAVENOUS at 07:03

## 2018-03-09 RX ADMIN — SODIUM CHLORIDE, SODIUM GLUCONATE, SODIUM ACETATE, POTASSIUM CHLORIDE, MAGNESIUM CHLORIDE, SODIUM PHOSPHATE, DIBASIC, AND POTASSIUM PHOSPHATE: .53; .5; .37; .037; .03; .012; .00082 INJECTION, SOLUTION INTRAVENOUS at 08:03

## 2018-03-09 RX ADMIN — OXYCODONE HYDROCHLORIDE 10 MG: 5 TABLET ORAL at 12:03

## 2018-03-09 RX ADMIN — FENTANYL CITRATE 25 MCG: 50 INJECTION, SOLUTION INTRAMUSCULAR; INTRAVENOUS at 12:03

## 2018-03-09 RX ADMIN — HEPARIN SODIUM 5000 UNITS: 5000 INJECTION, SOLUTION INTRAVENOUS; SUBCUTANEOUS at 06:03

## 2018-03-09 RX ADMIN — ACETAMINOPHEN 1000 MG: 10 INJECTION, SOLUTION INTRAVENOUS at 04:03

## 2018-03-09 RX ADMIN — ACETAMINOPHEN 1000 MG: 10 INJECTION, SOLUTION INTRAVENOUS at 11:03

## 2018-03-09 RX ADMIN — CEFAZOLIN 2 G: 330 INJECTION, POWDER, FOR SOLUTION INTRAMUSCULAR; INTRAVENOUS at 07:03

## 2018-03-09 RX ADMIN — PROPOFOL 140 MG: 10 INJECTION, EMULSION INTRAVENOUS at 07:03

## 2018-03-09 RX ADMIN — ONDANSETRON 4 MG: 2 INJECTION INTRAMUSCULAR; INTRAVENOUS at 04:03

## 2018-03-09 RX ADMIN — DEXMEDETOMIDINE HYDROCHLORIDE 10 MCG: 100 INJECTION, SOLUTION, CONCENTRATE INTRAVENOUS at 11:03

## 2018-03-09 RX ADMIN — PANTOPRAZOLE SODIUM 40 MG: 40 TABLET, DELAYED RELEASE ORAL at 12:03

## 2018-03-09 RX ADMIN — FENTANYL CITRATE 25 MCG: 50 INJECTION, SOLUTION INTRAMUSCULAR; INTRAVENOUS at 11:03

## 2018-03-09 RX ADMIN — SODIUM CHLORIDE, SODIUM GLUCONATE, SODIUM ACETATE, POTASSIUM CHLORIDE, MAGNESIUM CHLORIDE, SODIUM PHOSPHATE, DIBASIC, AND POTASSIUM PHOSPHATE: .53; .5; .37; .037; .03; .012; .00082 INJECTION, SOLUTION INTRAVENOUS at 10:03

## 2018-03-09 RX ADMIN — ONDANSETRON 4 MG: 2 INJECTION INTRAMUSCULAR; INTRAVENOUS at 11:03

## 2018-03-09 RX ADMIN — DEXMEDETOMIDINE HYDROCHLORIDE 20 MCG: 100 INJECTION, SOLUTION, CONCENTRATE INTRAVENOUS at 10:03

## 2018-03-09 RX ADMIN — CEFAZOLIN 1 G: 330 INJECTION, POWDER, FOR SOLUTION INTRAMUSCULAR; INTRAVENOUS at 11:03

## 2018-03-09 RX ADMIN — METOCLOPRAMIDE 10 MG: 5 INJECTION, SOLUTION INTRAMUSCULAR; INTRAVENOUS at 07:03

## 2018-03-09 RX ADMIN — SODIUM CHLORIDE: 0.9 INJECTION, SOLUTION INTRAVENOUS at 11:03

## 2018-03-09 RX ADMIN — GLYCOPYRROLATE 0.1 MG: 0.2 INJECTION, SOLUTION INTRAMUSCULAR; INTRAVENOUS at 06:03

## 2018-03-09 RX ADMIN — KETOROLAC TROMETHAMINE 15 MG: 30 INJECTION, SOLUTION INTRAMUSCULAR at 01:03

## 2018-03-09 RX ADMIN — CEFAZOLIN 1 G: 330 INJECTION, POWDER, FOR SOLUTION INTRAMUSCULAR; INTRAVENOUS at 04:03

## 2018-03-09 RX ADMIN — MIDAZOLAM HYDROCHLORIDE 2 MG: 1 INJECTION, SOLUTION INTRAMUSCULAR; INTRAVENOUS at 06:03

## 2018-03-09 NOTE — INTERVAL H&P NOTE
TI concur with the findings and changes have been noted since the H&P was written: the patient has been examined and the H&P has been reviewedpatient s/p ureteroscopy with subsequent bilateral stent removal. I concur with the findings and changes have been noted since the H&P was written: patient s/p ureteroscopy with subsequent bilateral stent removal.     Anesthesia/Surgery risks, benefits and alternative options discussed and understood by patient/family.          Active Hospital Problems    Diagnosis  POA    UPJ obstruction, congenital [Q62.11]  Not Applicable      Resolved Hospital Problems    Diagnosis Date Resolved POA   No resolved problems to display.

## 2018-03-09 NOTE — PLAN OF CARE
Rotbot time out completed with pre incision time out.  All DaVinci instruments inspected before case by Joslyn Scott.  All instruments appear to be intact.

## 2018-03-09 NOTE — OP NOTE
Certification of Assistant at Surgery       Surgery Date: 3/9/2018     Participating Surgeons:  Surgeon(s) and Role:     * Ariana Miller MD - Resident - Assisting     * Stan Reynoso MD - Primary    Procedures:  Procedure(s) (LRB):  XI ROBOTIC ASSISTED LAPAROSCOPIC PYELOPLASTY (Left)  Placement-Stent (Left)    Assistant Surgeon's Certification of Necessity:  I understand that section 1842 (b) (6) (d) of the Social Security Act generally prohibits Medicare Part B reasonable charge payment for the services of assistants at surgery in teaching hospitals when qualified residents are available to furnish such services. I certify that the services for which payment is claimed were medically necessary, and that no qualified resident was available to perform the services. I further understand that these services are subject to post-payment review by the Medicare carrier.      Hafsa Sher PA-C    03/09/2018  11:57 AM

## 2018-03-09 NOTE — PLAN OF CARE
Problem: Patient Care Overview  Goal: Plan of Care Review  Outcome: Ongoing (interventions implemented as appropriate)  Pt plan of care reviewed and updated. Pt frequently assessed for pain and safety issues. Pt receives prn pain meds when needed. Vs as charted. Will continue to monitor.

## 2018-03-09 NOTE — OP NOTE
[facility] Operative Note    [facility]  Operative Note    Date: 03/09/2018    Pre-Op Diagnosis:   1. Left ureteropelvic junction obstructioton  2. Left hydronephrosis  3. Decreased left renal function  4. Nephrolithiasis    Post-Op Diagnosis: same    Procedure(s) Performed:   1. Robotic left dismembered pyeloplasty  2. Reduction of renal pelvis  3. Ureterotomy for placement of left ureteral stent  4.  Left ureteral stent placement    Specimen(s): None    Staff Surgeon: Stan Reynoso MD    Assistant Surgeon:   Ariana Miller MD    Bedside assistant:  Hafsa Sher PA-C (no qualified resident available for bedside assistance).    Anesthesia: General endotracheal anesthesia    Indications: Sabina Clements is a 27 y.o. female who presented with right distal ureteral stone. On CT was found to have concerns for left UPJ obstruction. She had bilateral ureteral stents placed at that time and subsequent right ureteroscopy to clear stone burden as well as bilateral ureteral stent removal. Mag 3 lasix renal scan was obtained and this demonstrated 43% left renal function and t 1/2 of 25 minutes. Retrograde pyelogram confirmed high insertion of ureter and hydronephrosis consistent with obstruction secondary to UPJ obstruction. After careful consideration of all options and discussion of risks, she elected to proceed with repair.    Findings:   - lower pole crossing artery and vein causing UPJO  - redundant capacious renal pelvis was tailored/reduced  - dismembered pyeloplasty with anastomosis anterior to crossing vessels in most dependent portion of renal pelvis  - ureterolysis to pelvic inlet and mobilization of kidney allowed for tension free anastomosis  - ureteral stent placed antegrade via abdominal wall     Estimated Blood Loss: 50cc    Drains:   16 Fr banerjee catheter  6x24 JJ ureteral stent without strings    Procedure in Detail: After discussion of risks and benefits of the procedure, informed  consent was obtained.  The patient was brought to the operating room and placed supine on the operating table.  SCDs were applied and working prior to induction of anesthesia.  General anesthesia was administered.  A 16 Fr Yo catheter was placed in the standard fashion with 10 ml sterile water used to inflate the balloon.  An OG tube was placed.  The patient was then moved into the flank position with the left side up. The patient was appropriately padded and secured to the table. She was then prepped and draped in the usual sterile fashion.  Timeout was performed and preoperative antibiotics were confirmed.      A Veress needle was introduced into the abdomen.  Entry into the peritoneal cavity was confirmed with the drop test and an initial insufflation pressure of <10mmHg.  Aspiration during our drop test revealed no blood or succus.  The peritoneal cavity was insufflated up to 15 mmHg and the Veress was removed.  The location of the 12 mm camera port was marked with a marking pen and incised sharply using a 15 blade.  The 12 mm port was then introduced.  The camera was passed through the port and the abdomen was inspected and found to be free of bowel or vascular injury.  Using direct vision the other 2 robotic ports were placed, just below the left costal margin and lower on the left abdomen, ensuring at least 8 cm between ports to allow robotic mobility.  A 12 mm assistant port was placed infraumbilically.  Each trocar was introduced under direct vision ensuring no injury to the underlying abdominal contents.      Dissection began along the white line of Toldt, reflecting the colon medially away from the kidney. The splenic reflection was released.  The psoas muscle was identified. Once the colon was reflected, dissection was carried out just below the kidney, and the ureter was identified. The proximal renal pelvis dilated was identified. The ureter was elevated and we continued our dissection toward the  lower pole of the kidney proximally until we identified a lower pole crossing vein and artery. We did use a no-touch technique for ureterolysis and did keep ureteral blood supply and adventitia intact. The renal pelvis was very dilated proximal to the crossing vessels. This was mobilized. We did open the renal pelvis and a large portion was excised. We continued mobilization of the renal pelvis completely. The ureter was divided and the ureter was brought anterior to the crossing vessels. The kidney was moblized laterally and superiorly to decrease any tension on the anastomosis.    The distal ureter was spatulated and anastomosed to the most dependent portion of the renal pelvis with 4-0 monocryl apical stitches x2. The renal pelvis was closed partially followed by the posterior ureteral margin. An 6x24 JJ ureteral stent without strings was passed through the abdominal wall via a glidewire through an angiocatheter under direct vision. The proximal coil was placed within the renal pelvis and the repair was completed. This appeared tension free. The ureter was trimmed just prior to closure of the anastomosis.    The robot was undocked and all trocars were removed.  The 12 mm assistant port was closed with 2-0 vicryl on a UR-6.    All skin incisions were closed using 4-0 monocryl. Dermabond was applied to the incisions.     The patient tolerated the procedure well and was transferred to the recovery room in stable condition.    Disposition: The patient will remain on the urology service overnight for observation. Remove banerjee in am. KUB in recovery to confirm position of stent. Follow-up in 4-6 weeks for stent removal.    Ariana Miller

## 2018-03-09 NOTE — NURSING TRANSFER
Nursing Transfer Note      3/9/2018     Transfer To: 543B    Transfer via stretcher    Transfer with IV pole, banerjee    Transported by OCT    Medicines sent: none    Chart send with patient: Yes    Notified: liaison     Patient reassessed at: [id number]

## 2018-03-09 NOTE — TRANSFER OF CARE
"Anesthesia Transfer of Care Note    Patient: Sabina Clements    Procedure(s) Performed: Procedure(s) (LRB):  XI ROBOTIC ASSISTED LAPAROSCOPIC PYELOPLASTY (Left)  Placement-Stent (Left)    Patient location: PACU    Anesthesia Type: general    Transport from OR: Transported from OR on 6-10 L/min O2 by face mask with adequate spontaneous ventilation    Post pain: adequate analgesia    Post assessment: no apparent anesthetic complications    Post vital signs: stable    Level of consciousness: sedated    Nausea/Vomiting: no nausea/vomiting    Complications: none    Transfer of care protocol was followed      Last vitals:   Visit Vitals  /66   Pulse 70   Temp 36.7 °C (98.1 °F) (Temporal)   Resp 16   Ht 5' 5" (1.651 m)   Wt 56.7 kg (125 lb)   LMP 03/06/2018   SpO2 100%   Breastfeeding? No   BMI 20.80 kg/m²     "

## 2018-03-10 VITALS
OXYGEN SATURATION: 100 % | TEMPERATURE: 99 F | DIASTOLIC BLOOD PRESSURE: 76 MMHG | HEIGHT: 65 IN | SYSTOLIC BLOOD PRESSURE: 131 MMHG | WEIGHT: 125 LBS | HEART RATE: 64 BPM | RESPIRATION RATE: 15 BRPM | BODY MASS INDEX: 20.83 KG/M2

## 2018-03-10 PROCEDURE — 25000003 PHARM REV CODE 250: Performed by: UROLOGY

## 2018-03-10 PROCEDURE — 63600175 PHARM REV CODE 636 W HCPCS: Performed by: UROLOGY

## 2018-03-10 RX ORDER — POLYETHYLENE GLYCOL 3350 17 G/17G
17 POWDER, FOR SOLUTION ORAL DAILY
Qty: 30 PACKET | Refills: 0 | Status: SHIPPED | OUTPATIENT
Start: 2018-03-10

## 2018-03-10 RX ORDER — TAMSULOSIN HYDROCHLORIDE 0.4 MG/1
0.4 CAPSULE ORAL DAILY
Qty: 30 CAPSULE | Refills: 0 | Status: SHIPPED | OUTPATIENT
Start: 2018-03-10

## 2018-03-10 RX ORDER — HYOSCYAMINE SULFATE 0.12 MG/1
0.12 TABLET SUBLINGUAL EVERY 4 HOURS PRN
Qty: 20 TABLET | Refills: 1 | Status: SHIPPED | OUTPATIENT
Start: 2018-03-10 | End: 2021-11-27

## 2018-03-10 RX ORDER — OXYCODONE AND ACETAMINOPHEN 5; 325 MG/1; MG/1
1-2 TABLET ORAL EVERY 4 HOURS PRN
Qty: 21 TABLET | Refills: 0 | Status: SHIPPED | OUTPATIENT
Start: 2018-03-10

## 2018-03-10 RX ADMIN — POLYETHYLENE GLYCOL 3350 17 G: 17 POWDER, FOR SOLUTION ORAL at 08:03

## 2018-03-10 RX ADMIN — ACETAMINOPHEN 1000 MG: 10 INJECTION, SOLUTION INTRAVENOUS at 05:03

## 2018-03-10 RX ADMIN — TAMSULOSIN HYDROCHLORIDE 0.4 MG: 0.4 CAPSULE ORAL at 08:03

## 2018-03-10 RX ADMIN — PANTOPRAZOLE SODIUM 40 MG: 40 TABLET, DELAYED RELEASE ORAL at 08:03

## 2018-03-10 RX ADMIN — CEFAZOLIN 1 G: 330 INJECTION, POWDER, FOR SOLUTION INTRAMUSCULAR; INTRAVENOUS at 07:03

## 2018-03-10 NOTE — NURSING
Pt voided 150 cc after catheter removed. Urology team notified. OK to dcPt given discharge papers. IV removed

## 2018-03-10 NOTE — DISCHARGE SUMMARY
[facility]  Urology  Discharge Summary      Patient Name: Sabina Clements  MRN: [id number]  Admission Date: 3/9/2018  Hospital Length of Stay: 0 days  Discharge Date and Time:  03/10/2018 9:49 AM  Attending Physician: Stan Reynoso MD   Discharging Provider: Marco A Jenkins MD  Primary Care Physician: Primary Doctor No    HPI: 27 YOF POD 1 s/p:     Procedure(s) (LRB):  XI ROBOTIC ASSISTED LAPAROSCOPIC PYELOPLASTY (Left)  Placement-Stent (Left)     Indwelling Lines/Drains at time of discharge:   Lines/Drains/Airways     Drain                 Urethral Catheter 03/09/18 0716 Straight-tip;Non-latex 16 Fr. 1 day         Ureteral Drain/Stent 03/09/18 1045 Left ureter 6 Fr. less than 1 day                Hospital Course (synopsis of major diagnoses, care, treatment, and services provided during the course of the hospital stay):       Patient was admitted on 3/9/2018 for above procedure.  Patient tolerated the procedure well, hospital course was uncomplicated, and patient was discharged home in good condition with pain well controlled on 3/10/2018 after banerjee catheter was removed, and patient ambulated, voided, and tolerated regular diet.      Consults:     Significant Diagnostic Studies:     Pending Diagnostic Studies:     None          Final Active Diagnoses:    Diagnosis Date Noted POA    PRINCIPAL PROBLEM:  UPJ obstruction, congenital [Q62.11] 03/09/2018 Not Applicable    Ureteropelvic junction (UPJ) obstruction, left [N13.5] 01/20/2018 Yes    Nephrolithiasis [N20.0] 01/20/2018 Yes    Ureteral stone [N20.1] 01/19/2018 Yes    Hydroureteronephrosis, right [N13.30] 01/19/2018 Yes      Problems Resolved During this Admission:    Diagnosis Date Noted Date Resolved POA       Discharged Condition: good    Disposition: Home or Self Care    Follow Up:  Follow-up Information     Stan Reynoso MD In 2 weeks.    Specialty:  Urology  Why:  post op left UPJ repair   Contact information:  [address]  [facility] [address]  [phone]                 Patient Instructions:     Call MD for:  persistent nausea and vomiting or diarrhea     Call MD for:  severe uncontrolled pain     Call MD for:  redness, tenderness, or signs of infection (pain, swelling, redness, odor or green/yellow discharge around incision site)     Call MD for:  difficulty breathing or increased cough     Call MD for:  severe persistent headache     Call MD for:  worsening rash     Call MD for:  persistent dizziness, light-headedness, or visual disturbances     Call MD for:  increased confusion or weakness     No dressing needed     Other restrictions (specify):   Order Comments: No driving while taking pain medications.  No lifting more than 10 pounds for 6 weeks.  Do not submerge incisions.     CYSTOSCOPY W/ STENT REMOVAL   Standing Status: Future  Standing Exp. Date: 07/10/18       Medications:  Reconciled Home Medications:   Current Discharge Medication List      CONTINUE these medications which have CHANGED    Details   hyoscyamine (LEVSIN/SL) 0.125 mg Subl Place 1 tablet (0.125 mg total) under the tongue every 4 (four) hours as needed (bladder spasms).  Qty: 20 tablet, Refills: 1      oxyCODONE-acetaminophen (PERCOCET) 5-325 mg per tablet Take 1-2 tablets by mouth every 4 (four) hours as needed.  Qty: 21 tablet, Refills: 0      polyethylene glycol (GLYCOLAX) 17 gram PwPk Take 17 g by mouth once daily.  Qty: 30 packet, Refills: 0      tamsulosin (FLOMAX) 0.4 mg Cp24 Take 1 capsule (0.4 mg total) by mouth once daily.  Qty: 30 capsule, Refills: 0         STOP taking these medications       acetaminophen (TYLENOL) 325 MG tablet Comments:   Reason for Stopping:               Time spent on the discharge of patient: 30 minutes    Marco A Jenkins MD  Urology  [facility]

## 2018-04-25 ENCOUNTER — HOSPITAL ENCOUNTER (OUTPATIENT)
Dept: UROLOGY | Facility: HOSPITAL | Age: 28
Discharge: HOME | End: 2018-04-25
Attending: UROLOGY
Payer: COMMERCIAL

## 2018-04-25 VITALS
RESPIRATION RATE: 18 BRPM | SYSTOLIC BLOOD PRESSURE: 129 MMHG | WEIGHT: 128.31 LBS | HEIGHT: 65 IN | HEART RATE: 66 BPM | DIASTOLIC BLOOD PRESSURE: 74 MMHG | TEMPERATURE: 99 F | BODY MASS INDEX: 21.38 KG/M2

## 2018-04-25 DIAGNOSIS — Q62.39 UPJ OBSTRUCTION, CONGENITAL: Primary | ICD-10-CM

## 2018-04-25 DIAGNOSIS — N13.5 URETEROPELVIC JUNCTION (UPJ) OBSTRUCTION, LEFT: ICD-10-CM

## 2018-04-25 DIAGNOSIS — N20.0 NEPHROLITHIASIS: ICD-10-CM

## 2018-04-25 PROCEDURE — 52310 CYSTOSCOPY AND TREATMENT: CPT | Mod: 58,,, | Performed by: UROLOGY

## 2018-04-25 PROCEDURE — 52310 CYSTOSCOPY AND TREATMENT: CPT

## 2018-04-25 RX ORDER — LIDOCAINE HYDROCHLORIDE 20 MG/ML
JELLY TOPICAL
Status: COMPLETED | OUTPATIENT
Start: 2018-04-25 | End: 2018-04-25

## 2018-04-25 RX ORDER — CIPROFLOXACIN 500 MG/1
500 TABLET ORAL ONCE
Status: COMPLETED | OUTPATIENT
Start: 2018-04-25 | End: 2018-04-25

## 2018-04-25 RX ADMIN — LIDOCAINE HYDROCHLORIDE: 20 JELLY TOPICAL at 01:04

## 2018-04-25 RX ADMIN — CIPROFLOXACIN 500 MG: 500 TABLET ORAL at 01:04

## 2018-04-25 NOTE — PROCEDURES
Procedure: Flexible cysto-uretheroscopy and stent removal   Pre Procedure Diagnosis:s/p left robotic pyeloplasty and stent placement  Post Procedure Diagnosis:same   Surgeon: Stan Reynoso MD   Anesthesia: 2% uro-jet lidocaine jelly for local analgesia   Flexible cysto-urethroscopy was performed after consent was obtained. The risks and benefits were explained.   2% lidocaine urojet was used for local analgesia.   The genitalia was prepped and draped in the sterile fashion with betadine.   The flexible scope was advanced into the urethra and into the bladder. The stent was removed without difficulty.   The patient tolerated the procedure well without complication.   They will follow up in 3 months with a renal ultrasound

## 2018-04-25 NOTE — PATIENT INSTRUCTIONS

## 2018-04-25 NOTE — H&P
[facility]  History & Physical     Subjective:     Chief Complaint/Reason for Admission: stent removal    History of Present Illness:   Patient 27 y.o. female presents with a left UPJ obstruction.  She underwent a left robotic pyeloplasty.  She returns today for cysto/stent removal.    Patient Active Problem List    Diagnosis Date Noted    UPJ obstruction, congenital 03/09/2018    Ureteropelvic junction (UPJ) obstruction, left 01/20/2018    Nephrolithiasis 01/20/2018    Ureteral stone 01/19/2018    Hydroureteronephrosis, right 01/19/2018          (Not in a hospital admission)  Review of patient's allergies indicates:  No Known Allergies     History reviewed. No pertinent past medical history.   Past Surgical History:   Procedure Laterality Date    KIDNEY STONE SURGERY        History reviewed. No pertinent family history.   Social History   Substance Use Topics    Smoking status: Never Smoker    Smokeless tobacco: Never Used    Alcohol use No      Comment: very rarely        Review of Systems  All other systems reviewed and negative except pertinent positives noted in HPI.      Physical Exam   Constitutional: She is oriented to person, place, and time. She appears well-developed and well-nourished.   HENT:   Head: Normocephalic.   Eyes: EOM are normal.   Cardiovascular: Normal rate, intact distal pulses and normal pulses.    Pulmonary/Chest: No accessory muscle usage. No tachypnea. No respiratory distress.   Abdominal: Soft. Normal appearance. She exhibits no distension, no fluid wave, no ascites and no mass. There is no tenderness. There is no rebound, no guarding and no CVA tenderness. No hernia.   Musculoskeletal: Normal range of motion.   Neurological: She is alert and oriented to person, place, and time.   Skin: No bruising and no rash noted.   Psychiatric: She has a normal mood and affect. Her speech is normal and behavior is normal. Thought content normal.         OBJECTIVE:     Patient  "Vitals for the past 8 hrs:   BP Temp Temp src Pulse Resp Height Weight   04/25/18 1326 122/74 98.5 °F (36.9 °C) Other (see 70 18 5' 5" (1.651 m) 58.2 kg (128 lb 4.9 oz)         Data Review:  No results for input(s): COLORU, CLARITYU, SPECGRAV, PHUR, PROTEINUA, GLUCOSEU, BILIRUBINCON, BLOODU, WBCU, RBCU, BACTERIA, MUCUS, NITRITE, LEUKOCYTESUR, UROBILINOGEN, HYALINECASTS in the last 168 hours.    ASSESSMENT/PLAN:     There are no hospital problems to display for this patient.    Patient Active Problem List    Diagnosis Date Noted    UPJ obstruction, congenital 03/09/2018    Ureteropelvic junction (UPJ) obstruction, left 01/20/2018    Nephrolithiasis 01/20/2018    Ureteral stone 01/19/2018    Hydroureteronephrosis, right 01/19/2018   28 yo female s/p robotic pyeloplasty here for cysto/stent removal.    Plan:  I have explained the indication, risks, benefits, and alternatives of the procedure in detail.  The patient voices understanding and all questions have been answered.  The patient agrees to proceed as planned with cystoscopy and stent removal.    "

## 2018-07-25 ENCOUNTER — OFFICE VISIT (OUTPATIENT)
Dept: UROLOGY | Facility: CLINIC | Age: 28
End: 2018-07-25
Payer: COMMERCIAL

## 2018-07-25 ENCOUNTER — HOSPITAL ENCOUNTER (OUTPATIENT)
Dept: RADIOLOGY | Facility: HOSPITAL | Age: 28
Discharge: HOME | End: 2018-07-25
Attending: UROLOGY
Payer: COMMERCIAL

## 2018-07-25 VITALS
HEART RATE: 68 BPM | SYSTOLIC BLOOD PRESSURE: 119 MMHG | HEIGHT: 65 IN | BODY MASS INDEX: 20.49 KG/M2 | WEIGHT: 123 LBS | DIASTOLIC BLOOD PRESSURE: 66 MMHG

## 2018-07-25 DIAGNOSIS — Q62.39 UPJ OBSTRUCTION, CONGENITAL: ICD-10-CM

## 2018-07-25 DIAGNOSIS — N20.0 KIDNEY STONES: Primary | ICD-10-CM

## 2018-07-25 PROCEDURE — 3008F BODY MASS INDEX DOCD: CPT | Mod: CPTII,S$GLB,, | Performed by: UROLOGY

## 2018-07-25 PROCEDURE — 99214 OFFICE O/P EST MOD 30 MIN: CPT | Mod: S$GLB,,, | Performed by: UROLOGY

## 2018-07-25 PROCEDURE — 99999 PR PBB SHADOW E&M-EST. PATIENT-LVL III: CPT | Mod: PBBFAC,,, | Performed by: UROLOGY

## 2018-07-25 PROCEDURE — 76770 US EXAM ABDO BACK WALL COMP: CPT | Mod: TC

## 2018-07-25 PROCEDURE — 76770 US EXAM ABDO BACK WALL COMP: CPT | Mod: 26,,, | Performed by: RADIOLOGY

## 2018-07-25 NOTE — PROGRESS NOTES
Subjective:       Patient ID: Sabina Clements is a 27 y.o. female.    Chief Complaint:  Nephrolithiasis and Results      History of Present Illness  HPI  Patient is a 27 y.o. female who is established to our clinic and was initially referred byDr. Wynne for evaluation of kidney stones and upj obstruction.    She underwent a right ureteroscopy for distal right ureteral stone in January.  She also had an incidental finding on CT scan of the likely left UPJ obstruction.  She had bilateral ureteral stents placed at that time.  Of note, she was asymptomatic on the left at the time of presentation.    She denies fevers or chills. No abdominal pain.  No nausea or vomiting.  No other complaints today.  She is s/p left robotic pyeloplasty on 3/9/18.        Review of Systems  Review of Systems  All other systems reviewed and negative except pertinent positives noted in HPI.       Objective:     Physical Exam   Constitutional: She is oriented to person, place, and time. She appears well-developed and well-nourished. She is cooperative.  Non-toxic appearance.   HENT:   Head: Normocephalic.   Cardiovascular: Normal rate, intact distal pulses and normal pulses.    Pulmonary/Chest: Effort normal. No accessory muscle usage. No tachypnea. No respiratory distress.   Abdominal: Soft. Normal appearance. She exhibits no distension, no fluid wave, no ascites and no mass. There is no tenderness. There is no rebound and no CVA tenderness. No hernia.       Liver/spleen non-palpable   Musculoskeletal: Normal range of motion.   Neurological: She is alert and oriented to person, place, and time. She has normal strength.   Skin: No bruising and no rash noted.     Psychiatric: She has a normal mood and affect. Her speech is normal and behavior is normal. Thought content normal.       Lab Review  Lab Results   Component Value Date    COLORU Yellow 02/12/2018    SPECGRAV 1.015 02/12/2018    PHUR 6.0 02/12/2018    NITRITE Negative 02/12/2018     PROTEINUR 7.8 01/19/2018    KETONESU Negative 02/12/2018    UROBILINOGEN Negative 02/12/2018    BILIRUBINUR 0.8 01/19/2018         Assessment:        1. Kidney stones    2. UPJ obstruction, congenital          Plan:     Kidney stones    UPJ obstruction, congenital    -General risk factors for kidney stones and the conservative measures to prevent kidney stones in the future were discussed with the patient in detail.  The patient was encouraged to drink 2-3 liters of water a day, limit iced tea and melony as well as foods high in oxalate.  They were cautioned to try to limit salt and red meat intake.  We also discussed adding citrate to the diet with the addition of víctor or lemon juice to their water or alternatively with crystal light.   -discussed metabolic stone w/u---patient would like to hold off for now.  -Ultrasound was independently reviewed today and reveals persistent hydronephrosis (appears less significant compared to pre-op CT scan). Also shows small non-obstructing right renal stones.   -plan for mag-3 renal scan. Will call the patient with results.   -dispo/future plan pending results of mag-3 renal scan.

## 2018-08-02 ENCOUNTER — HOSPITAL ENCOUNTER (OUTPATIENT)
Dept: RADIOLOGY | Facility: HOSPITAL | Age: 28
Discharge: HOME | End: 2018-08-02
Attending: UROLOGY
Payer: COMMERCIAL

## 2018-08-02 DIAGNOSIS — N20.0 KIDNEY STONES: Primary | ICD-10-CM

## 2018-08-02 DIAGNOSIS — Q62.39 UPJ OBSTRUCTION, CONGENITAL: ICD-10-CM

## 2018-08-02 PROCEDURE — 78708 K FLOW/FUNCT IMAGE W/DRUG: CPT | Mod: 26,,, | Performed by: RADIOLOGY

## 2018-08-02 PROCEDURE — A9562 TC99M MERTIATIDE: HCPCS

## 2019-01-18 ENCOUNTER — HOSPITAL ENCOUNTER (EMERGENCY)
Facility: HOSPITAL | Age: 29
Discharge: HOME | End: 2019-01-18
Attending: EMERGENCY MEDICINE

## 2019-01-18 VITALS
HEART RATE: 83 BPM | WEIGHT: 130 LBS | BODY MASS INDEX: 21.66 KG/M2 | SYSTOLIC BLOOD PRESSURE: 112 MMHG | RESPIRATION RATE: 18 BRPM | TEMPERATURE: 98 F | DIASTOLIC BLOOD PRESSURE: 72 MMHG | HEIGHT: 65 IN | OXYGEN SATURATION: 100 %

## 2019-01-18 DIAGNOSIS — R11.2 NON-INTRACTABLE VOMITING WITH NAUSEA, UNSPECIFIED VOMITING TYPE: Primary | ICD-10-CM

## 2019-01-18 LAB
AMORPH CRY URNS QL MICRO: ABNORMAL
B-HCG UR QL: NEGATIVE
BACTERIA #/AREA URNS HPF: ABNORMAL /HPF
BILIRUB UR QL STRIP: NEGATIVE
CLARITY UR: CLEAR
COLOR UR: YELLOW
CTP QC/QA: YES
GLUCOSE UR QL STRIP: NEGATIVE
HGB UR QL STRIP: ABNORMAL
HYALINE CASTS #/AREA URNS LPF: 7 /LPF
KETONES UR QL STRIP: NEGATIVE
LEUKOCYTE ESTERASE UR QL STRIP: NEGATIVE
MICROSCOPIC COMMENT: ABNORMAL
NITRITE UR QL STRIP: NEGATIVE
PH UR STRIP: 6 [PH] (ref 5–8)
PROT UR QL STRIP: ABNORMAL
RBC #/AREA URNS HPF: 3 /HPF (ref 0–4)
SP GR UR STRIP: 1.02 (ref 1–1.03)
SQUAMOUS #/AREA URNS HPF: 5 /HPF
URN SPEC COLLECT METH UR: ABNORMAL
UROBILINOGEN UR STRIP-ACNC: ABNORMAL EU/DL
WBC #/AREA URNS HPF: 2 /HPF (ref 0–5)

## 2019-01-18 PROCEDURE — 25000003 PHARM REV CODE 250: Performed by: EMERGENCY MEDICINE

## 2019-01-18 PROCEDURE — 99283 EMERGENCY DEPT VISIT LOW MDM: CPT

## 2019-01-18 PROCEDURE — 81025 URINE PREGNANCY TEST: CPT | Performed by: NURSE PRACTITIONER

## 2019-01-18 PROCEDURE — 81000 URINALYSIS NONAUTO W/SCOPE: CPT

## 2019-01-18 RX ORDER — ONDANSETRON 4 MG/1
4 TABLET, ORALLY DISINTEGRATING ORAL EVERY 6 HOURS PRN
Qty: 10 TABLET | Refills: 0 | Status: SHIPPED | OUTPATIENT
Start: 2019-01-18

## 2019-01-18 RX ORDER — ONDANSETRON 2 MG/ML
4 INJECTION INTRAMUSCULAR; INTRAVENOUS
Status: DISCONTINUED | OUTPATIENT
Start: 2019-01-18 | End: 2019-01-18

## 2019-01-18 RX ORDER — ONDANSETRON 4 MG/1
4 TABLET, ORALLY DISINTEGRATING ORAL
Status: COMPLETED | OUTPATIENT
Start: 2019-01-18 | End: 2019-01-18

## 2019-01-18 RX ADMIN — ONDANSETRON 4 MG: 4 TABLET, ORALLY DISINTEGRATING ORAL at 06:01

## 2019-01-19 NOTE — ED PROVIDER NOTES
Encounter Date: 1/18/2019    SCRIBE #1 NOTE: I, Franky Peter , am scribing for, and in the presence of,  Twin Suazo MD . I have scribed the following portions of the note - Other sections scribed: HPI, ROS .       History     Chief Complaint   Patient presents with    Emesis     reports havign nausea that started today. denies abdominal pain, and fever     The patient is  29 y/o female with no chronic medical conditions who presents with nausea and vomiting that began 2:00 this afternoon. Her last meal consisted of rice and gravy at noon while at work. She is unsure if anyone else at work is ill. She is currently on her menstrual cycle and has taken Advil for her abdominal cramping today. She denies fever, chills, headache, lightheadedness, dizziness, chest pain, back pain, cough, and shortness of breath. She reports multiple episodes of bilious emesis. There are no exacerbating or alleviating factors.        The history is provided by the patient. No  was used.     Review of patient's allergies indicates:  No Known Allergies  No past medical history on file.  Past Surgical History:   Procedure Laterality Date    CYSTOSCOPY N/A 1/20/2018    Performed by Agus Wynne MD at [facility] OR [facility]ST [facility]    CYSTOSCOPY WITH STENT REMOVAL Bilateral 2/23/2018    Performed by Stan Reynoso MD at [facility] OR [facility]    EXTRACTION - STONE Right 2/23/2018    Performed by [facility]oso [facility] at [facility]    EXTRACTION - STONE Right 1/20/2018    Performed by [facility] Wynne [facility] at [facility]    KIDNEY STONE SURGERY      LITHOTRIPSY-LASER Right 2/23/2018    Performed by [facility]oso [facility] at [facility]    LITHOTRIPSY-LASER Right 1/20/2018    Performed by [facility]ne [facility] at [facility]    Placement-Stent Left 3/9/2018    Performed by Stan [facility]oso MD at [facility] OR [facility]    PLACEMENT-STENT Bilateral 1/20/2018    Performed by [facility] Wynne MD at [facility]     PYELOGRAM-RETROGRADE Bilateral 2/23/2018    Performed [facility] at [facility]ST [facility]    PYELOGRAM-RETROGRADE Bilateral 1/20/2018    [facility]formed [facility] at [facility] OR [facility]    REPLACEMENT-STENT Right 2/23/2018    Performed by [facility] at [facility] OR [facility]    URETEROSCOPY Right 2/23/2018    Performed by [facility] at [facility] OR [facility]    URETEROSCOPY Right 1/20/2018    Performed by [facility]ne MD at [facility] OR [facility]ST [facility]    XI ROBOTIC ASSISTED LAPAROSCOPIC PYELOPLASTY Left 3/9/2018    Performed by [facility]oso MD at [facility] OR 2ND [facility]     No family history on file.  Social History     Tobacco Use    Smoking status: Never Smoker    Smokeless tobacco: Never Used   Substance Use Topics    Alcohol use: No     Alcohol/week: 0.0 oz     Comment: very rarely    Drug use: Yes     Types: Marijuana     Review of Systems   Constitutional: Negative for appetite change, chills, diaphoresis, fatigue and fever.   HENT: Negative for sore throat and trouble swallowing.    Eyes: Negative for visual disturbance.   Respiratory: Negative for cough and shortness of breath.    Cardiovascular: Negative for chest pain and palpitations.   Gastrointestinal: Positive for nausea and vomiting. Negative for abdominal pain, constipation and diarrhea.   Genitourinary: Positive for vaginal bleeding (menstruation). Negative for difficulty urinating, dysuria, hematuria and vaginal discharge.   Musculoskeletal: Negative for arthralgias and myalgias.   Skin: Negative for rash.   Neurological: Negative for dizziness, syncope, weakness, light-headedness, numbness and headaches.       Physical Exam     Initial Vitals [01/18/19 1759]   BP Pulse Resp Temp SpO2   (!) 118/56 73 18 98.9 °F (37.2 °C) 100 %      MAP       --         Physical Exam    Nursing note and vitals reviewed.  Constitutional: She appears well-developed and well-nourished. She is not diaphoretic. No distress.   HENT:   Mouth/Throat:  Oropharynx is clear and moist.   Eyes: Conjunctivae are normal. No scleral icterus.   Cardiovascular: Normal rate, regular rhythm and normal heart sounds. Exam reveals no gallop and no friction rub.    No murmur heard.  Pulmonary/Chest: Breath sounds normal. No respiratory distress. She has no wheezes. She has no rhonchi. She has no rales.   Abdominal: Soft. Bowel sounds are normal. She exhibits no distension. There is no tenderness.   Neurological: She is alert and oriented to person, place, and time. She has normal strength. No cranial nerve deficit or sensory deficit. GCS eye subscore is 4. GCS verbal subscore is 5. GCS motor subscore is 6.   Skin: Skin is warm and dry. No pallor.         ED Course   Procedures  Labs Reviewed   URINALYSIS, REFLEX TO URINE CULTURE - Abnormal; Notable for the following components:       Result Value    Protein, UA 1+ (*)     Occult Blood UA 3+ (*)     Urobilinogen, UA 2.0-3.0 (*)     All other components within normal limits   URINALYSIS MICROSCOPIC - Abnormal; Notable for the following components:    Bacteria, UA Few (*)     Hyaline Casts, UA 7 (*)     All other components within normal limits   POCT URINE PREGNANCY          Imaging Results    None          Medical Decision Making:   Clinical Tests:   Lab Tests: Ordered and Reviewed  ED Management:  2030  Pt reports improved symptoms. Able to tolerate oral intake.   Medical decision making:  This patient was evaluated for acute nausea vomiting without abdominal pain.  She is afebrile with normal/stable vital signs.  She has no clinical signs of dehydration.  She has a benign abdominal examination. The symptoms began approximately 2 hr after eating lunch. Pregnancy test is negative.  She has no urinary symptoms. Further evaluation with lab and imaging studies not felt to be indicated.  She was treated with Zofran ODT and had improvement in her symptoms.  She is now tolerating oral intake.  Her symptoms are likely due to mild food  poisoning, gastroenteritis, or some other benign, self-limiting process.  I will prescribe additional Zofran ODT for home use.  She has been given strict return precautions.  Otherwise, she has been advised to follow up with the PCP in a few days for recheck.            Scribe Attestation:   Scribe #1: I performed the above scribed service and the documentation accurately describes the services I performed. I attest to the accuracy of the note.    Attending Attestation:           Physician Attestation for Scribe:  Physician Attestation Statement for Scribe #1: I, Twin Suazo MD , reviewed documentation, as scribed by Franky Peter in my presence, and it is both accurate and complete.                    Clinical Impression:   The encounter diagnosis was Non-intractable vomiting with nausea, unspecified vomiting type.      Disposition:   Disposition: Discharged  Condition: Stable                        Twin Castrejon III, MD  01/18/19 2052

## 2019-03-04 ENCOUNTER — OFFICE VISIT (OUTPATIENT)
Dept: UROLOGY | Facility: CLINIC | Age: 29
End: 2019-03-04
Payer: COMMERCIAL

## 2019-03-04 ENCOUNTER — HOSPITAL ENCOUNTER (OUTPATIENT)
Dept: RADIOLOGY | Facility: HOSPITAL | Age: 29
Discharge: HOME | End: 2019-03-04
Attending: UROLOGY
Payer: COMMERCIAL

## 2019-03-04 VITALS
SYSTOLIC BLOOD PRESSURE: 112 MMHG | WEIGHT: 130.06 LBS | HEART RATE: 70 BPM | BODY MASS INDEX: 21.64 KG/M2 | DIASTOLIC BLOOD PRESSURE: 64 MMHG

## 2019-03-04 DIAGNOSIS — N20.0 KIDNEY STONES: Primary | ICD-10-CM

## 2019-03-04 DIAGNOSIS — Q62.39 UPJ OBSTRUCTION, CONGENITAL: ICD-10-CM

## 2019-03-04 DIAGNOSIS — N20.0 KIDNEY STONES: ICD-10-CM

## 2019-03-04 PROCEDURE — 76770 US EXAM ABDO BACK WALL COMP: CPT | Mod: TC

## 2019-03-04 PROCEDURE — 99213 PR OFFICE/OUTPT VISIT, EST, LEVL III, 20-29 MIN: ICD-10-PCS | Mod: S$GLB,,, | Performed by: UROLOGY

## 2019-03-04 PROCEDURE — 3008F BODY MASS INDEX DOCD: CPT | Mod: CPTII,S$GLB,, | Performed by: UROLOGY

## 2019-03-04 PROCEDURE — 76770 US RETROPERITONEAL COMPLETE: ICD-10-PCS | Mod: 26,,, | Performed by: RADIOLOGY

## 2019-03-04 PROCEDURE — 99213 OFFICE O/P EST LOW 20 MIN: CPT | Mod: S$GLB,,, | Performed by: UROLOGY

## 2019-03-04 PROCEDURE — 99999 PR PBB SHADOW E&M-EST. PATIENT-LVL III: CPT | Mod: PBBFAC,,, | Performed by: UROLOGY

## 2019-03-04 PROCEDURE — 3008F PR BODY MASS INDEX (BMI) DOCUMENTED: ICD-10-PCS | Mod: CPTII,S$GLB,, | Performed by: UROLOGY

## 2019-03-04 PROCEDURE — 99999 PR PBB SHADOW E&M-EST. PATIENT-LVL III: ICD-10-PCS | Mod: PBBFAC,,, | Performed by: UROLOGY

## 2019-03-04 PROCEDURE — 76770 US EXAM ABDO BACK WALL COMP: CPT | Mod: 26,,, | Performed by: RADIOLOGY

## 2019-03-04 NOTE — PROGRESS NOTES
Subjective:       Patient ID: Sabina Clements is a 28 y.o. female.    Chief Complaint:  Follow-up (u/s)      History of Present Illness  HPI  Patient is a 28 y.o. female who is established to our clinic and was initially referred by Dr. Wynne for evaluation of kidney stones and upj obstruction.    She underwent a right ureteroscopy for distal right ureteral stone in January.  She also had an incidental finding on CT scan of the likely left UPJ obstruction.  She had bilateral ureteral stents placed at that time.  Of note, she was asymptomatic on the left at the time of presentation.    She denies fevers or chills. No abdominal pain.  No nausea or vomiting.  No other complaints today.  She is s/p left robotic pyeloplasty on 3/9/18.  She underwent an ultrasound today.       Review of Systems  Review of Systems  All other systems reviewed and negative except pertinent positives noted in HPI.       Objective:     Physical Exam   Constitutional: She is oriented to person, place, and time. She appears well-developed and well-nourished. No distress.   HENT:   Head: Normocephalic and atraumatic.   Eyes: No scleral icterus.   Neck: No tracheal deviation present.   Pulmonary/Chest: Effort normal. No respiratory distress.   Neurological: She is alert and oriented to person, place, and time.   Psychiatric: She has a normal mood and affect. Her behavior is normal. Judgment and thought content normal.       Lab Review  Lab Results   Component Value Date    COLORU Yellow 01/18/2019    SPECGRAV 1.025 01/18/2019    PHUR 6.0 01/18/2019    NITRITE Negative 01/18/2019    PROTEINUR 7.8 01/19/2018    KETONESU Negative 01/18/2019    UROBILINOGEN 2.0-3.0 (A) 01/18/2019    BILIRUBINUR 0.8 01/19/2018         Assessment:        1. Kidney stones    2. UPJ obstruction, congenital          Plan:     Kidney stones  -     US Retroperitoneal Complete (Kidney and; Future; Expected date: 09/04/2019    UPJ obstruction, congenital    -Ultrasound was  independently reviewed today and reveals mild hydronephrosis, improved.  -prior mag-3 renal scan showed no obstruction.  -repeat renal US in 6 months.   I spent 15 minutes with the patient of which more than half was spent in direct consultation with the patient in regards to our treatment and plan.

## 2019-09-16 ENCOUNTER — OFFICE VISIT (OUTPATIENT)
Dept: UROLOGY | Facility: CLINIC | Age: 29
End: 2019-09-16
Payer: COMMERCIAL

## 2019-09-16 ENCOUNTER — HOSPITAL ENCOUNTER (OUTPATIENT)
Dept: RADIOLOGY | Facility: HOSPITAL | Age: 29
Discharge: HOME | End: 2019-09-16
Attending: UROLOGY
Payer: COMMERCIAL

## 2019-09-16 VITALS — BODY MASS INDEX: 22.01 KG/M2 | WEIGHT: 132.25 LBS

## 2019-09-16 DIAGNOSIS — N20.0 KIDNEY STONES: Primary | ICD-10-CM

## 2019-09-16 DIAGNOSIS — N20.0 KIDNEY STONES: ICD-10-CM

## 2019-09-16 DIAGNOSIS — Q62.39 UPJ OBSTRUCTION, CONGENITAL: ICD-10-CM

## 2019-09-16 PROCEDURE — 99213 PR OFFICE/OUTPT VISIT, EST, LEVL III, 20-29 MIN: ICD-10-PCS | Mod: S$GLB,,, | Performed by: UROLOGY

## 2019-09-16 PROCEDURE — 99213 OFFICE O/P EST LOW 20 MIN: CPT | Mod: S$GLB,,, | Performed by: UROLOGY

## 2019-09-16 PROCEDURE — 76770 US RETROPERITONEAL COMPLETE: ICD-10-PCS | Mod: 26,,, | Performed by: RADIOLOGY

## 2019-09-16 PROCEDURE — 99999 PR PBB SHADOW E&M-EST. PATIENT-LVL II: CPT | Mod: PBBFAC,,, | Performed by: UROLOGY

## 2019-09-16 PROCEDURE — 3008F PR BODY MASS INDEX (BMI) DOCUMENTED: ICD-10-PCS | Mod: CPTII,S$GLB,, | Performed by: UROLOGY

## 2019-09-16 PROCEDURE — 76770 US EXAM ABDO BACK WALL COMP: CPT | Mod: TC

## 2019-09-16 PROCEDURE — 3008F BODY MASS INDEX DOCD: CPT | Mod: CPTII,S$GLB,, | Performed by: UROLOGY

## 2019-09-16 PROCEDURE — 99999 PR PBB SHADOW E&M-EST. PATIENT-LVL II: ICD-10-PCS | Mod: PBBFAC,,, | Performed by: UROLOGY

## 2019-09-16 PROCEDURE — 76770 US EXAM ABDO BACK WALL COMP: CPT | Mod: 26,,, | Performed by: RADIOLOGY

## 2019-09-16 NOTE — PROGRESS NOTES
Subjective:       Patient ID: Sabina Clements is a 29 y.o. female.    Chief Complaint:  Kidney stones, upj obstruction      History of Present Illness  HPI  Patient is a 29 y.o. female who is established to our clinic and was initially referred by Dr. Wynne for evaluation of kidney stones and upj obstruction.    She underwent a right ureteroscopy for distal right ureteral stone in January of 2018.  She also had an incidental finding on CT scan of the likely left UPJ obstruction.  She had bilateral ureteral stents placed at that time.  Of note, she was asymptomatic on the left at the time of presentation.    She denies fevers or chills. No abdominal pain.  No nausea or vomiting.  No other complaints today.  She is s/p left robotic pyeloplasty on 3/9/18. She had a mag-3 renal scan done on 8/2/18 showing no sign of persistent obstruction.    She underwent an ultrasound today.  She denies any significant flank pain.  No nausea or vomiting.  No complaints today.      Review of Systems  Review of Systems  All other systems reviewed and negative except pertinent positives noted in HPI.       Objective:     Physical Exam   Constitutional: She is oriented to person, place, and time. She appears well-developed and well-nourished. No distress.   HENT:   Head: Normocephalic and atraumatic.   Eyes: No scleral icterus.   Neck: No tracheal deviation present.   Pulmonary/Chest: Effort normal. No respiratory distress.   Neurological: She is alert and oriented to person, place, and time.   Psychiatric: She has a normal mood and affect. Her behavior is normal. Judgment and thought content normal.       Lab Review  Lab Results   Component Value Date    COLORU Yellow 01/18/2019    SPECGRAV 1.025 01/18/2019    PHUR 6.0 01/18/2019    NITRITE Negative 01/18/2019    PROTEINUR 7.8 01/19/2018    KETONESU Negative 01/18/2019    UROBILINOGEN 2.0-3.0 (A) 01/18/2019    BILIRUBINUR 0.8 01/19/2018         Assessment:        1. Kidney stones    2.  UPJ obstruction, congenital          Plan:     Kidney stones  -     X-Ray Abdomen AP 1 View; Future; Expected date: 09/16/2020  -     US Retroperitoneal Complete (Kidney and; Future; Expected date: 09/16/2020    UPJ obstruction, congenital       -Ultrasound was independently reviewed today and reveals mild hydronephrosis, improved from pre-op and stable since last imaging.   -prior mag-3 renal scan showed no obstruction.  -repeat renal US and KUB in 12 months.     I spent 15 minutes with the patient of which more than half was spent in direct consultation with the patient in regards to our treatment and plan.

## 2019-09-17 ENCOUNTER — HOSPITAL ENCOUNTER (EMERGENCY)
Facility: HOSPITAL | Age: 29
Discharge: HOME | End: 2019-09-17
Attending: EMERGENCY MEDICINE
Payer: COMMERCIAL

## 2019-09-17 VITALS
HEART RATE: 61 BPM | WEIGHT: 132 LBS | OXYGEN SATURATION: 98 % | RESPIRATION RATE: 18 BRPM | DIASTOLIC BLOOD PRESSURE: 57 MMHG | SYSTOLIC BLOOD PRESSURE: 109 MMHG | BODY MASS INDEX: 21.99 KG/M2 | HEIGHT: 65 IN | TEMPERATURE: 98 F

## 2019-09-17 DIAGNOSIS — R11.2 NON-INTRACTABLE VOMITING WITH NAUSEA, UNSPECIFIED VOMITING TYPE: Primary | ICD-10-CM

## 2019-09-17 DIAGNOSIS — R19.7 DIARRHEA, UNSPECIFIED TYPE: ICD-10-CM

## 2019-09-17 LAB
B-HCG UR QL: NEGATIVE
CTP QC/QA: YES

## 2019-09-17 PROCEDURE — 99283 EMERGENCY DEPT VISIT LOW MDM: CPT

## 2019-09-17 PROCEDURE — 81025 URINE PREGNANCY TEST: CPT | Performed by: PHYSICIAN ASSISTANT

## 2019-09-17 PROCEDURE — 25000003 PHARM REV CODE 250: Performed by: PHYSICIAN ASSISTANT

## 2019-09-17 RX ORDER — ACETAMINOPHEN 500 MG
1000 TABLET ORAL
Status: COMPLETED | OUTPATIENT
Start: 2019-09-17 | End: 2019-09-17

## 2019-09-17 RX ORDER — ONDANSETRON 4 MG/1
8 TABLET, FILM COATED ORAL EVERY 12 HOURS PRN
Qty: 12 TABLET | Refills: 0 | Status: SHIPPED | OUTPATIENT
Start: 2019-09-17

## 2019-09-17 RX ORDER — DICYCLOMINE HYDROCHLORIDE 10 MG/1
20 CAPSULE ORAL
Status: COMPLETED | OUTPATIENT
Start: 2019-09-17 | End: 2019-09-17

## 2019-09-17 RX ORDER — ONDANSETRON 8 MG/1
8 TABLET, ORALLY DISINTEGRATING ORAL
Status: COMPLETED | OUTPATIENT
Start: 2019-09-17 | End: 2019-09-17

## 2019-09-17 RX ORDER — DICYCLOMINE HYDROCHLORIDE 20 MG/1
20 TABLET ORAL 4 TIMES DAILY
Qty: 12 TABLET | Refills: 0 | Status: SHIPPED | OUTPATIENT
Start: 2019-09-17 | End: 2019-09-20

## 2019-09-17 RX ADMIN — ONDANSETRON 8 MG: 8 TABLET, ORALLY DISINTEGRATING ORAL at 08:09

## 2019-09-17 RX ADMIN — ACETAMINOPHEN 1000 MG: 500 TABLET ORAL at 08:09

## 2019-09-17 RX ADMIN — DICYCLOMINE HYDROCHLORIDE 20 MG: 10 CAPSULE ORAL at 08:09

## 2019-09-18 NOTE — ED PROVIDER NOTES
Encounter Date: 9/17/2019    SCRIBE #1 NOTE: I, Norbert Feng, am scribing for, and in the presence of,  Trevor Contreras PA-C. I have scribed the following portions of the note - Other sections scribed: HPI, ROS.       History     Chief Complaint   Patient presents with    Emesis     pt reports 5 episodes of vomit and 2 episodes of diarrhea since coming home from work at 1pm    Diarrhea     CC: Emesis    HPI: This is a 29 y.o. F who has no PMHx who presents to the ED for emergent evaluation of acute emesis that began at 12:30pm today. Pt has associated nausea and diarrhea. She reports 6 episodes of vomiting today. Pt notes that she attempted drinking Ginger ale PTA, but was unable to tolerate the fluids. No OTC treatment attempted PTA. Pt denies abdominal pain, back pain, or headache.    The history is provided by the patient. No  was used.     Review of patient's allergies indicates:  No Known Allergies  History reviewed. No pertinent past medical history.  Past Surgical History:   Procedure Laterality Date    CYSTOSCOPY N/A 1/20/2018    Performed [facility] at [facility] OR [facility]ST [facility]    CYSTOSCOPY WITH STENT REMOVAL Bilateral 2/23/2018    Performed by [facility]oso [facility] at [facility]    EXTRACTION - STONE Right 2/23/2018    Performed by [facility]    EXTRACTION - STONE Right 1/20/2018    Performed by [facility] at [facility]    KIDNEY STONE SURGERY      LITHOTRIPSY-LASER Right 2/23/2018    Performed by [facility] at [facility]    LITHOTRIPSY-LASER Right 1/20/2018    Performed [facility] at [facility]    Placement-Stent Left 3/9/2018    Performed by [facility] at [facility] OR [facility]    PLACEMENT-STENT Bilateral 1/20/2018    Performed [facility] at [facility]    PYELOGRAM-RETROGRADE Bilateral 2/23/2018    Performed by [facility] at [facility] OR [address] [place] [address]     PYELOGRAM-RETROGRADE Bilateral 1/20/2018    [facility]    REPLACEMENT-STENT Right 2/23/2018    Performed by [facility] at [facility]    URETEROSCOPY Right 2/23/2018    Performed by [facility] at [facility]    URETEROSCOPY Right 1/20/2018    Performed by [facility] at [facility] OR [facility]    XI ROBOTIC ASSISTED LAPAROSCOPIC PYELOPLASTY Left 3/9/2018    Performed by [facility] at [facility] OR 2ND [facility]     History reviewed. No pertinent family history.  Social History     Tobacco Use    Smoking status: Never Smoker    Smokeless tobacco: Never Used   Substance Use Topics    Alcohol use: No     Alcohol/week: 0.0 oz     Comment: very rarely    Drug use: Yes     Types: Marijuana     Review of Systems   Constitutional: Negative for fever.   HENT: Negative for rhinorrhea and sore throat.    Respiratory: Negative for cough and shortness of breath.    Cardiovascular: Negative for chest pain.   Gastrointestinal: Positive for diarrhea, nausea and vomiting. Negative for abdominal pain.   Genitourinary: Negative for dysuria.   Musculoskeletal: Negative for back pain.   Skin: Negative for rash.   Neurological: Negative for weakness and headaches.   Hematological: Does not bruise/bleed easily.       Physical Exam     Initial Vitals [09/17/19 1838]   BP Pulse Resp Temp SpO2   126/67 74 18 98.4 °F (36.9 °C) 100 %      MAP       --         Physical Exam    Nursing note and vitals reviewed.  Constitutional: She appears well-developed and well-nourished. She is not diaphoretic. No distress.   HENT:   Head: Normocephalic and atraumatic.   Nose: Nose normal.   Mouth/Throat: Oropharynx is clear and moist.   Eyes: Conjunctivae and EOM are normal. Right eye exhibits no discharge. Left eye exhibits no discharge.   Neck: Normal range of motion. No tracheal deviation present. No JVD present.   Cardiovascular: Normal rate, regular rhythm and normal heart  sounds. Exam reveals no friction rub.    No murmur heard.  Pulmonary/Chest: Breath sounds normal. No stridor. No respiratory distress. She has no wheezes. She has no rhonchi. She has no rales. She exhibits no tenderness.   Abdominal: Soft. She exhibits no distension. There is no tenderness. There is no rebound and no guarding.   Musculoskeletal: Normal range of motion.   Neurological: She is alert and oriented to person, place, and time.   Skin: Skin is warm and dry. No rash and no abscess noted. No erythema. No pallor.         ED Course   Procedures  Labs Reviewed   POCT URINE PREGNANCY          Imaging Results    None          Medical Decision Making:   History:   Old Medical Records: I decided to obtain old medical records.    This is an emergent evaluation of a 29 y.o. female presenting to the ED for nausea, non-bloody/bilious vomiting, and non-bloody diarrhea. Denies fever, inability to tolerate PO, significant weight loss, recent hospitalization or use of antibiotics, or symptoms lasting greater than a week. Endorses sick contacts with similar symptoms. Afebrile. Patient is non-toxic appearing and in no acute distress.    Moist mucus membranes- no evidence of volume depletion or dehydration and patient reports ability to orally hydrate well- will treat symptoms and PO challenge. Reports resolution of symptoms. Appears well and is now tolerating PO. Vitals stable. Repeat abdominal exam benign.    Given rapid onset and characteristics of this patient's spectrum of symptoms, short duration of symptoms, and benign abdominal exam, patient likely has a variety of viral gastroenteritis. I do not feel there is indication for stool studies to assess for bacterial etiology at this time. No strong indication for imaging of abdomen at this time. Given the above, I have also considered but doubt C. difficile, IBD, infectious colitis, DKA, SBO, pancreatitis, acute cholecystitis, pyelonephritis, ureteral stone, and  appendicitis.     Discharged home with supportive care. Advised maintaining adequate hydration and switching to a liquid diet; advising diet as tolerated. Instructed to follow up with PCP for reevaluation and management of symptoms.     I discussed with the patient the diagnosis, treatment plan, indications for return to the emergency department, and for expected follow-up. The patient verbalized an understanding. The patient is asked if there are any questions or concerns. We discuss the case, until all issues are addressed to the patients satisfaction. Patient understands and is agreeable to the plan.             I, Trevor Contreras PA-C, personally performed the services described in this documentation. All medical record entries made by the scribe were at my direction and in my presence.  I have reviewed the chart and agree that the record reflects my personal performance and is accurate and complete.        Clinical Impression:       ICD-10-CM ICD-9-CM   1. Non-intractable vomiting with nausea, unspecified vomiting type R11.2 787.01   2. Diarrhea, unspecified type R19.7 787.91                                Trevor Contreras PA-C  09/17/19 8337

## 2019-09-18 NOTE — ED TRIAGE NOTES
Pt cc Emesis pt reports 5 episodes of vomit and 2 episodes of diarrhea since coming home from work at 1pm

## 2021-11-27 ENCOUNTER — HOSPITAL ENCOUNTER (EMERGENCY)
Facility: HOSPITAL | Age: 31
Discharge: HOME | End: 2021-11-27
Attending: EMERGENCY MEDICINE
Payer: OTHER GOVERNMENT

## 2021-11-27 VITALS
BODY MASS INDEX: 23.32 KG/M2 | HEART RATE: 72 BPM | TEMPERATURE: 99 F | SYSTOLIC BLOOD PRESSURE: 111 MMHG | DIASTOLIC BLOOD PRESSURE: 66 MMHG | RESPIRATION RATE: 20 BRPM | HEIGHT: 65 IN | WEIGHT: 140 LBS | OXYGEN SATURATION: 100 %

## 2021-11-27 DIAGNOSIS — R19.7 NAUSEA VOMITING AND DIARRHEA: Primary | ICD-10-CM

## 2021-11-27 DIAGNOSIS — R11.2 NAUSEA VOMITING AND DIARRHEA: Primary | ICD-10-CM

## 2021-11-27 LAB
ALBUMIN SERPL BCP-MCNC: 4.5 G/DL (ref 3.5–5.2)
ALP SERPL-CCNC: 75 U/L (ref 55–135)
ALT SERPL W/O P-5'-P-CCNC: 17 U/L (ref 10–44)
ANION GAP SERPL CALC-SCNC: 12 MMOL/L (ref 8–16)
ANION GAP SERPL CALC-SCNC: 9 MMOL/L (ref 8–16)
AST SERPL-CCNC: 22 U/L (ref 10–40)
B-HCG UR QL: NEGATIVE
BASOPHILS # BLD AUTO: 0.02 K/UL (ref 0–0.2)
BASOPHILS NFR BLD: 0.2 % (ref 0–1.9)
BILIRUB SERPL-MCNC: 1.3 MG/DL (ref 0.1–1)
BILIRUB UR QL STRIP: NEGATIVE
BUN SERPL-MCNC: 14 MG/DL (ref 6–20)
BUN SERPL-MCNC: 15 MG/DL (ref 6–30)
CALCIUM SERPL-MCNC: 10 MG/DL (ref 8.7–10.5)
CHLORIDE SERPL-SCNC: 106 MMOL/L (ref 95–110)
CHLORIDE SERPL-SCNC: 106 MMOL/L (ref 95–110)
CLARITY UR: CLEAR
CO2 SERPL-SCNC: 23 MMOL/L (ref 23–29)
COLOR UR: YELLOW
CREAT SERPL-MCNC: 0.8 MG/DL (ref 0.5–1.4)
CREAT SERPL-MCNC: 0.9 MG/DL (ref 0.5–1.4)
CTP QC/QA: YES
DIFFERENTIAL METHOD: ABNORMAL
EOSINOPHIL # BLD AUTO: 0 K/UL (ref 0–0.5)
EOSINOPHIL NFR BLD: 0.3 % (ref 0–8)
ERYTHROCYTE [DISTWIDTH] IN BLOOD BY AUTOMATED COUNT: 12.7 % (ref 11.5–14.5)
EST. GFR  (AFRICAN AMERICAN): >60 ML/MIN/1.73 M^2
EST. GFR  (NON AFRICAN AMERICAN): >60 ML/MIN/1.73 M^2
GLUCOSE SERPL-MCNC: 101 MG/DL (ref 70–110)
GLUCOSE SERPL-MCNC: 105 MG/DL (ref 70–110)
GLUCOSE UR QL STRIP: NEGATIVE
HCT VFR BLD AUTO: 42.4 % (ref 37–48.5)
HCT VFR BLD CALC: 38 %PCV (ref 36–54)
HGB BLD-MCNC: 14 G/DL (ref 12–16)
HGB UR QL STRIP: NEGATIVE
IMM GRANULOCYTES # BLD AUTO: 0.03 K/UL (ref 0–0.04)
IMM GRANULOCYTES NFR BLD AUTO: 0.3 % (ref 0–0.5)
KETONES UR QL STRIP: ABNORMAL
LEUKOCYTE ESTERASE UR QL STRIP: NEGATIVE
LIPASE SERPL-CCNC: 10 U/L (ref 4–60)
LYMPHOCYTES # BLD AUTO: 0.3 K/UL (ref 1–4.8)
LYMPHOCYTES NFR BLD: 3.5 % (ref 18–48)
MCH RBC QN AUTO: 30.2 PG (ref 27–31)
MCHC RBC AUTO-ENTMCNC: 33 G/DL (ref 32–36)
MCV RBC AUTO: 91 FL (ref 82–98)
MONOCYTES # BLD AUTO: 0.3 K/UL (ref 0.3–1)
MONOCYTES NFR BLD: 3.7 % (ref 4–15)
NEUTROPHILS # BLD AUTO: 8.1 K/UL (ref 1.8–7.7)
NEUTROPHILS NFR BLD: 92 % (ref 38–73)
NITRITE UR QL STRIP: NEGATIVE
NRBC BLD-RTO: 0 /100 WBC
PH UR STRIP: 7 [PH] (ref 5–8)
PLATELET # BLD AUTO: 203 K/UL (ref 150–450)
PMV BLD AUTO: 11.3 FL (ref 9.2–12.9)
POC IONIZED CALCIUM: 1.26 MMOL/L (ref 1.06–1.42)
POC MOLECULAR INFLUENZA A AGN: NEGATIVE
POC MOLECULAR INFLUENZA B AGN: NEGATIVE
POC TCO2 (MEASURED): 24 MMOL/L (ref 23–29)
POTASSIUM BLD-SCNC: 4.3 MMOL/L (ref 3.5–5.1)
POTASSIUM SERPL-SCNC: 4.4 MMOL/L (ref 3.5–5.1)
PROT SERPL-MCNC: 8.7 G/DL (ref 6–8.4)
PROT UR QL STRIP: ABNORMAL
RBC # BLD AUTO: 4.64 M/UL (ref 4–5.4)
SAMPLE: NORMAL
SARS-COV-2 RDRP RESP QL NAA+PROBE: NEGATIVE
SODIUM BLD-SCNC: 137 MMOL/L (ref 136–145)
SODIUM SERPL-SCNC: 138 MMOL/L (ref 136–145)
SP GR UR STRIP: 1.02 (ref 1–1.03)
URN SPEC COLLECT METH UR: ABNORMAL
UROBILINOGEN UR STRIP-ACNC: NEGATIVE EU/DL
WBC # BLD AUTO: 8.86 K/UL (ref 3.9–12.7)

## 2021-11-27 PROCEDURE — 25000003 PHARM REV CODE 250: Performed by: PHYSICIAN ASSISTANT

## 2021-11-27 PROCEDURE — 82565 ASSAY OF CREATININE: CPT

## 2021-11-27 PROCEDURE — U0002 COVID-19 LAB TEST NON-CDC: HCPCS | Performed by: PHYSICIAN ASSISTANT

## 2021-11-27 PROCEDURE — 99284 EMERGENCY DEPT VISIT MOD MDM: CPT | Mod: 25

## 2021-11-27 PROCEDURE — 85025 COMPLETE CBC W/AUTO DIFF WBC: CPT | Performed by: PHYSICIAN ASSISTANT

## 2021-11-27 PROCEDURE — 84295 ASSAY OF SERUM SODIUM: CPT

## 2021-11-27 PROCEDURE — 81025 URINE PREGNANCY TEST: CPT | Performed by: PHYSICIAN ASSISTANT

## 2021-11-27 PROCEDURE — 96360 HYDRATION IV INFUSION INIT: CPT

## 2021-11-27 PROCEDURE — 84132 ASSAY OF SERUM POTASSIUM: CPT

## 2021-11-27 PROCEDURE — 83690 ASSAY OF LIPASE: CPT | Performed by: PHYSICIAN ASSISTANT

## 2021-11-27 PROCEDURE — 85014 HEMATOCRIT: CPT

## 2021-11-27 PROCEDURE — 81003 URINALYSIS AUTO W/O SCOPE: CPT | Performed by: PHYSICIAN ASSISTANT

## 2021-11-27 PROCEDURE — 80053 COMPREHEN METABOLIC PANEL: CPT | Performed by: PHYSICIAN ASSISTANT

## 2021-11-27 PROCEDURE — 99900035 HC TECH TIME PER 15 MIN (STAT)

## 2021-11-27 RX ORDER — ACETAMINOPHEN 500 MG
1000 TABLET ORAL
Status: COMPLETED | OUTPATIENT
Start: 2021-11-27 | End: 2021-11-27

## 2021-11-27 RX ORDER — ONDANSETRON 4 MG/1
8 TABLET, ORALLY DISINTEGRATING ORAL 2 TIMES DAILY
Qty: 20 TABLET | Refills: 0 | Status: SHIPPED | OUTPATIENT
Start: 2021-11-27 | End: 2021-12-02

## 2021-11-27 RX ORDER — ONDANSETRON 8 MG/1
8 TABLET, ORALLY DISINTEGRATING ORAL
Status: COMPLETED | OUTPATIENT
Start: 2021-11-27 | End: 2021-11-27

## 2021-11-27 RX ADMIN — SODIUM CHLORIDE 1000 ML: 0.9 INJECTION, SOLUTION INTRAVENOUS at 12:11

## 2021-11-27 RX ADMIN — ACETAMINOPHEN 1000 MG: 500 TABLET ORAL at 11:11

## 2021-11-27 RX ADMIN — ONDANSETRON 8 MG: 8 TABLET, ORALLY DISINTEGRATING ORAL at 11:11

## 2023-11-25 NOTE — ANESTHESIA POSTPROCEDURE EVALUATION
"Anesthesia Post Evaluation    Patient: Sabina Clements    Procedure(s) Performed: Procedure(s) (LRB):  XI ROBOTIC ASSISTED LAPAROSCOPIC PYELOPLASTY (Left)  Placement-Stent (Left)    Final Anesthesia Type: general  Patient location during evaluation: PACU  Patient participation: Yes- Able to Participate  Level of consciousness: awake and alert and oriented  Post-procedure vital signs: reviewed and stable  Pain management: adequate  Airway patency: patent  PONV status at discharge: No PONV  Anesthetic complications: no      Cardiovascular status: blood pressure returned to baseline  Respiratory status: unassisted, room air and spontaneous ventilation  Hydration status: euvolemic  Follow-up not needed.        Visit Vitals  /62   Pulse (!) 59   Temp 36.7 °C (98.1 °F) (Temporal)   Resp 10   Ht 5' 5" (1.651 m)   Wt 56.7 kg (125 lb)   LMP 03/06/2018   SpO2 100%   Breastfeeding? No   BMI 20.80 kg/m²       Pain/Sandra Score: Pain Assessment Performed: Yes (3/9/2018 12:41 PM)  Presence of Pain: complains of pain/discomfort (3/9/2018 12:41 PM)  Pain Rating Prior to Med Admin: 5 (3/9/2018 12:35 PM)  Sandra Score: 10 (3/9/2018 12:41 PM)      "
No

## (undated) DEVICE — SUT 0 VICRYL / UR6 (J603)

## (undated) DEVICE — SOL IRR NACL .9% 3000ML

## (undated) DEVICE — NDL 22GA X1 1/2 REG BEVEL

## (undated) DEVICE — CATH 5FR OPEN END URETERAL

## (undated) DEVICE — DRAPE STERI INSTRUMENT 1018

## (undated) DEVICE — ELECTRODE REM PLYHSV RETURN 9

## (undated) DEVICE — KIT VUETIP TROCAR SWAB

## (undated) DEVICE — ADHESIVE DERMABOND ADVANCED

## (undated) DEVICE — SUT MONOCRYL 3-0 UNDYED RB1

## (undated) DEVICE — WIRE GD LUB STD 3CM .038 150CM

## (undated) DEVICE — SYR 10CC LUER LOCK

## (undated) DEVICE — GUIDE WIRE MOTION .035 X 150CM

## (undated) DEVICE — GUIDEWIRE STR TIP HIWIRE 150CM

## (undated) DEVICE — LUBRICANT SURGILUBE 2 OZ

## (undated) DEVICE — SYR SLIP TIP 20CC

## (undated) DEVICE — SOL ELECTROLUBE ANTI-STIC

## (undated) DEVICE — DRAPE ABDOMINAL TIBURON 14X11

## (undated) DEVICE — CANNULA REDUCER 12-8MM

## (undated) DEVICE — SOL IRR WATER STRL 3000 ML

## (undated) DEVICE — TRAY FOLEY 16FR INFECTION CONT

## (undated) DEVICE — TRAY CYSTO BASIN

## (undated) DEVICE — SUT 2-0 NYLON D/A

## (undated) DEVICE — GOWN X-LG STERILE BACK

## (undated) DEVICE — NDL INSUF ULTRA VERESS 120MM

## (undated) DEVICE — PACK CYSTO

## (undated) DEVICE — COVER TIP CURVED SCISSORS XI

## (undated) DEVICE — FIBER LASER HOLMIUM 365 MICRON

## (undated) DEVICE — DRAPE ARM DAVINCI XI

## (undated) DEVICE — SYR ONLY LUER LOCK 20CC

## (undated) DEVICE — CATH CONE TIP

## (undated) DEVICE — EXTRACTOR TIPLESS 2.4FRX1115CM

## (undated) DEVICE — CLIPPER BLADE MOD 4406 (CAREF)

## (undated) DEVICE — SYR 30CC LUER LOCK

## (undated) DEVICE — STAPLER SKIN PROXIMATE WIDE

## (undated) DEVICE — BLADE SURG #15 CARBON STEEL

## (undated) DEVICE — FIBER LASER HOLMIUM 273 MICRON

## (undated) DEVICE — SEE MEDLINE ITEM 152622

## (undated) DEVICE — SET TRI-LUMEN FILTERED TUBE

## (undated) DEVICE — SEE MEDLINE ITEM 157181

## (undated) DEVICE — IRRIGATOR ENDOSCOPY DISP.

## (undated) DEVICE — CLIP HEMO-LOK MLX LARGE LF

## (undated) DEVICE — SOL NS 1000CC

## (undated) DEVICE — SOL WATER STRL IRR 1000ML

## (undated) DEVICE — GOWN SURGICAL X-LARGE

## (undated) DEVICE — GOWN SMARTGOWN LVL4 X-LONG XL

## (undated) DEVICE — SEE MEDLINE ITEM 154981

## (undated) DEVICE — DRAPE COLUMN DAVINCI XI

## (undated) DEVICE — SUT MCRYL PLUS 4-0 PS2 27IN

## (undated) DEVICE — SUT MONOCRYL 3-0 RB1

## (undated) DEVICE — SEAL UNIVERSAL 5MM-8MM XI

## (undated) DEVICE — PACK PERI/GYN OPTIMA

## (undated) DEVICE — SET TUR BLADDER IRRIG Y TYPE

## (undated) DEVICE — COVER LIGHT HANDLE

## (undated) DEVICE — VALVE ENDOSCOPIC(SURESEAL II)

## (undated) DEVICE — SET IRR URLGY 2LINE UNIV SPIKE